# Patient Record
Sex: FEMALE | Race: WHITE | NOT HISPANIC OR LATINO | URBAN - METROPOLITAN AREA
[De-identification: names, ages, dates, MRNs, and addresses within clinical notes are randomized per-mention and may not be internally consistent; named-entity substitution may affect disease eponyms.]

---

## 2017-03-20 ENCOUNTER — OUTPATIENT (OUTPATIENT)
Dept: OUTPATIENT SERVICES | Facility: HOSPITAL | Age: 77
LOS: 1 days | End: 2017-03-20
Payer: MEDICARE

## 2017-03-20 DIAGNOSIS — R06.02 SHORTNESS OF BREATH: ICD-10-CM

## 2017-03-20 PROCEDURE — 78452 HT MUSCLE IMAGE SPECT MULT: CPT

## 2017-03-20 PROCEDURE — 93018 CV STRESS TEST I&R ONLY: CPT

## 2017-03-20 PROCEDURE — 78452 HT MUSCLE IMAGE SPECT MULT: CPT | Mod: 26

## 2017-03-20 PROCEDURE — 93017 CV STRESS TEST TRACING ONLY: CPT

## 2017-03-20 PROCEDURE — A9500: CPT

## 2017-03-20 PROCEDURE — 93016 CV STRESS TEST SUPVJ ONLY: CPT

## 2017-03-20 PROCEDURE — A9505: CPT

## 2017-03-21 ENCOUNTER — APPOINTMENT (OUTPATIENT)
Dept: VASCULAR SURGERY | Facility: CLINIC | Age: 77
End: 2017-03-21

## 2017-10-15 ENCOUNTER — EMERGENCY (EMERGENCY)
Facility: HOSPITAL | Age: 77
LOS: 1 days | Discharge: HOME | End: 2017-10-15
Attending: EMERGENCY MEDICINE | Admitting: EMERGENCY MEDICINE
Payer: MEDICARE

## 2017-10-15 VITALS
SYSTOLIC BLOOD PRESSURE: 159 MMHG | RESPIRATION RATE: 14 BRPM | HEART RATE: 86 BPM | OXYGEN SATURATION: 96 % | DIASTOLIC BLOOD PRESSURE: 85 MMHG | WEIGHT: 134.04 LBS | TEMPERATURE: 98 F

## 2017-10-15 DIAGNOSIS — Z79.82 LONG TERM (CURRENT) USE OF ASPIRIN: ICD-10-CM

## 2017-10-15 DIAGNOSIS — Z87.891 PERSONAL HISTORY OF NICOTINE DEPENDENCE: ICD-10-CM

## 2017-10-15 DIAGNOSIS — E78.00 PURE HYPERCHOLESTEROLEMIA, UNSPECIFIED: ICD-10-CM

## 2017-10-15 DIAGNOSIS — Z88.8 ALLERGY STATUS TO OTHER DRUGS, MEDICAMENTS AND BIOLOGICAL SUBSTANCES: ICD-10-CM

## 2017-10-15 DIAGNOSIS — Z79.899 OTHER LONG TERM (CURRENT) DRUG THERAPY: ICD-10-CM

## 2017-10-15 DIAGNOSIS — R07.89 OTHER CHEST PAIN: ICD-10-CM

## 2017-10-15 LAB
ALBUMIN SERPL ELPH-MCNC: 4.4 G/DL — SIGNIFICANT CHANGE UP (ref 3.3–5)
ALP SERPL-CCNC: 287 U/L — HIGH (ref 40–120)
ALT FLD-CCNC: 21 U/L — SIGNIFICANT CHANGE UP (ref 10–45)
ANION GAP SERPL CALC-SCNC: 13 MMOL/L — SIGNIFICANT CHANGE UP (ref 5–17)
AST SERPL-CCNC: 36 U/L — SIGNIFICANT CHANGE UP (ref 10–40)
BASOPHILS NFR BLD AUTO: 0.2 % — SIGNIFICANT CHANGE UP (ref 0–2)
BILIRUB SERPL-MCNC: 0.8 MG/DL — SIGNIFICANT CHANGE UP (ref 0.2–1.2)
BUN SERPL-MCNC: 21 MG/DL — SIGNIFICANT CHANGE UP (ref 7–23)
CALCIUM SERPL-MCNC: 11.1 MG/DL — HIGH (ref 8.4–10.5)
CHLORIDE SERPL-SCNC: 103 MMOL/L — SIGNIFICANT CHANGE UP (ref 96–108)
CK MB CFR SERPL CALC: 1.7 NG/ML — SIGNIFICANT CHANGE UP (ref 0–6.7)
CO2 SERPL-SCNC: 25 MMOL/L — SIGNIFICANT CHANGE UP (ref 22–31)
CREAT SERPL-MCNC: 0.86 MG/DL — SIGNIFICANT CHANGE UP (ref 0.5–1.3)
EOSINOPHIL NFR BLD AUTO: 2 % — SIGNIFICANT CHANGE UP (ref 0–6)
GLUCOSE SERPL-MCNC: 105 MG/DL — HIGH (ref 70–99)
HCT VFR BLD CALC: 41.5 % — SIGNIFICANT CHANGE UP (ref 34.5–45)
HGB BLD-MCNC: 14.4 G/DL — SIGNIFICANT CHANGE UP (ref 11.5–15.5)
LYMPHOCYTES # BLD AUTO: 16.6 % — SIGNIFICANT CHANGE UP (ref 13–44)
MCHC RBC-ENTMCNC: 31.7 PG — SIGNIFICANT CHANGE UP (ref 27–34)
MCHC RBC-ENTMCNC: 34.7 G/DL — SIGNIFICANT CHANGE UP (ref 32–36)
MCV RBC AUTO: 91.4 FL — SIGNIFICANT CHANGE UP (ref 80–100)
MONOCYTES NFR BLD AUTO: 9.6 % — SIGNIFICANT CHANGE UP (ref 2–14)
NEUTROPHILS NFR BLD AUTO: 71.6 % — SIGNIFICANT CHANGE UP (ref 43–77)
PLATELET # BLD AUTO: 246 K/UL — SIGNIFICANT CHANGE UP (ref 150–400)
POTASSIUM SERPL-MCNC: 4.1 MMOL/L — SIGNIFICANT CHANGE UP (ref 3.5–5.3)
POTASSIUM SERPL-SCNC: 4.1 MMOL/L — SIGNIFICANT CHANGE UP (ref 3.5–5.3)
PROT SERPL-MCNC: 8.4 G/DL — HIGH (ref 6–8.3)
RBC # BLD: 4.54 M/UL — SIGNIFICANT CHANGE UP (ref 3.8–5.2)
RBC # FLD: 14.1 % — SIGNIFICANT CHANGE UP (ref 10.3–16.9)
SODIUM SERPL-SCNC: 141 MMOL/L — SIGNIFICANT CHANGE UP (ref 135–145)
TROPONIN T SERPL-MCNC: <0.01 NG/ML — SIGNIFICANT CHANGE UP (ref 0–0.01)
WBC # BLD: 8.4 K/UL — SIGNIFICANT CHANGE UP (ref 3.8–10.5)
WBC # FLD AUTO: 8.4 K/UL — SIGNIFICANT CHANGE UP (ref 3.8–10.5)

## 2017-10-15 PROCEDURE — 93010 ELECTROCARDIOGRAM REPORT: CPT

## 2017-10-15 PROCEDURE — 99285 EMERGENCY DEPT VISIT HI MDM: CPT | Mod: 25

## 2017-10-15 RX ORDER — DIPHENHYDRAMINE HCL 50 MG
50 CAPSULE ORAL ONCE
Qty: 0 | Refills: 0 | Status: COMPLETED | OUTPATIENT
Start: 2017-10-15 | End: 2017-10-15

## 2017-10-15 RX ADMIN — Medication 40 MILLIGRAM(S): at 20:45

## 2017-10-15 RX ADMIN — Medication 50 MILLIGRAM(S): at 23:41

## 2017-10-15 NOTE — ED PROVIDER NOTE - ATTENDING CONTRIBUTION TO CARE
77F with midsternal chest pain after tearing corn, pt with hx of thoracic aneurysm, pt denies sob, n/v/diaphoresis. Was sent to ED yesterday by city MD yesterday. + smoker hx, nonspecific st/t changes.

## 2017-10-15 NOTE — ED PROVIDER NOTE - PHYSICAL EXAMINATION
CONSTITUTIONAL: Well-appearing; well-nourished; in no apparent distress.   HEAD: Normocephalic; atraumatic.   EYES: PERRL; EOM intact; conjunctiva and sclera clear  ENT: normal nose; no rhinorrhea; normal pharynx with no erythema or lesions.   NECK: Supple; non-tender; no LAD  CARDIOVASCULAR: Normal S1, S2; no murmurs, rubs, or gallops. Regular rate and rhythm. +tenderness to mid chest wall   RESPIRATORY: Breathing easily; breath sounds clear and equal bilaterally; no wheezes, rhonchi, or rales.  GI: Soft; non-distended; non-tender; no palpable organomegaly.   MSK: FROM at all extremities, normal tone   EXT: No cyanosis or edema; N/V intact  SKIN: Normal for age and race; warm; dry; good turgor; no apparent lesions or rash.   NEURO: A & O x 3; face symmetric; grossly unremarkable.   PSYCHOLOGICAL: The patient’s mood and manner are appropriate.

## 2017-10-15 NOTE — ED ADULT NURSE REASSESSMENT NOTE - NS ED NURSE REASSESS COMMENT FT1
pt aaox3 no deficits no sob no n/v.  mild chest pain worse with movement.  PA in speaking with pt about ct scan and needing to be pre-medicated due to her allergy to IVP dye.

## 2017-10-15 NOTE — ED ADULT NURSE NOTE - CHPI ED SYMPTOMS NEG
no back pain/no vomiting/Denies HA, visual changes, numbness or tingling to extremities./no syncope/no diaphoresis/no dizziness/no chills/no cough/no nausea/no shortness of breath/no fever

## 2017-10-15 NOTE — ED PROVIDER NOTE - PROGRESS NOTE DETAILS
Pt requesting to be discharged, prelim read negative for dissection, pt states her usual aneurysm is 4.2 cm. Agrees to call back in AM for results, agrees to f/u with Dr. Santos tomorrow in AM. Will dc with prelim results for pt request.

## 2017-10-15 NOTE — ED PROVIDER NOTE - MEDICAL DECISION MAKING DETAILS
76 yo F with pmh of a throacic aortic aneurysm, HLD and chronic back pain c/p midsternal chest pain that started 2 days ago while she was trying to break apart a piece of corn. 78 yo F with pmh of a throacic aortic aneurysm, HLD and chronic back pain c/p midsternal chest pain that started 2 days ago while she was trying to break apart a piece of corn. EKG no ST/T changes. Pain reproducible to palpation. Likely msk pain, will r/o ACS, aneurysmal rupture or dissection.

## 2017-10-15 NOTE — ED ADULT TRIAGE NOTE - CHIEF COMPLAINT QUOTE
was diagnosed with thoracic aneurysm 1 year ago ; has check up every 3 months ; was supposed to go in the next few weeks but has to call her MD because she cant make it;; has CP with movement and stretching - went to urgent care had EKG and CXR and sent here for further evaluation ; client states she needs a CT

## 2017-10-15 NOTE — ED ADULT NURSE NOTE - OBJECTIVE STATEMENT
Patient comes in ambulatory from [facility] into bed #4 complaining of constant (cannot describe pain)7/10, non radiating, mid sternal chest pain that occurred Friday night. Patient reports, "I was breaking a piece of corn on the cob and I felt something move in my chest." Since then patient reports having the pain which is worsened with deep breathing, positions and movement. Prior tx Advil minimally effective. Hx of aortic anerysm.

## 2017-10-16 VITALS
OXYGEN SATURATION: 96 % | SYSTOLIC BLOOD PRESSURE: 161 MMHG | DIASTOLIC BLOOD PRESSURE: 81 MMHG | RESPIRATION RATE: 16 BRPM | HEART RATE: 87 BPM | TEMPERATURE: 98 F

## 2017-10-16 PROCEDURE — 71275 CT ANGIOGRAPHY CHEST: CPT

## 2017-10-16 PROCEDURE — 74174 CTA ABD&PLVS W/CONTRAST: CPT | Mod: 26

## 2017-10-16 PROCEDURE — 74174 CTA ABD&PLVS W/CONTRAST: CPT

## 2017-10-16 PROCEDURE — 80053 COMPREHEN METABOLIC PANEL: CPT

## 2017-10-16 PROCEDURE — 93005 ELECTROCARDIOGRAM TRACING: CPT

## 2017-10-16 PROCEDURE — 96375 TX/PRO/DX INJ NEW DRUG ADDON: CPT | Mod: XU

## 2017-10-16 PROCEDURE — 71275 CT ANGIOGRAPHY CHEST: CPT | Mod: 26

## 2017-10-16 PROCEDURE — 82550 ASSAY OF CK (CPK): CPT

## 2017-10-16 PROCEDURE — 96374 THER/PROPH/DIAG INJ IV PUSH: CPT | Mod: XU

## 2017-10-16 PROCEDURE — 85025 COMPLETE CBC W/AUTO DIFF WBC: CPT

## 2017-10-16 PROCEDURE — 84484 ASSAY OF TROPONIN QUANT: CPT

## 2017-10-16 PROCEDURE — 99284 EMERGENCY DEPT VISIT MOD MDM: CPT | Mod: 25

## 2017-10-16 PROCEDURE — 82553 CREATINE MB FRACTION: CPT

## 2018-02-13 ENCOUNTER — APPOINTMENT (OUTPATIENT)
Dept: VASCULAR SURGERY | Facility: CLINIC | Age: 78
End: 2018-02-13
Payer: MEDICARE

## 2018-02-13 VITALS — HEART RATE: 63 BPM | SYSTOLIC BLOOD PRESSURE: 135 MMHG | DIASTOLIC BLOOD PRESSURE: 82 MMHG | OXYGEN SATURATION: 96 %

## 2018-02-13 DIAGNOSIS — I65.21 OCCLUSION AND STENOSIS OF RIGHT CAROTID ARTERY: ICD-10-CM

## 2018-02-13 PROCEDURE — 93880 EXTRACRANIAL BILAT STUDY: CPT

## 2018-02-13 PROCEDURE — 99214 OFFICE O/P EST MOD 30 MIN: CPT | Mod: 25

## 2018-02-13 RX ORDER — ROSUVASTATIN CALCIUM 10 MG/1
10 TABLET, FILM COATED ORAL
Refills: 0 | Status: ACTIVE | COMMUNITY

## 2018-03-20 ENCOUNTER — APPOINTMENT (OUTPATIENT)
Dept: VASCULAR SURGERY | Facility: CLINIC | Age: 78
End: 2018-03-20
Payer: MEDICARE

## 2018-03-20 VITALS — SYSTOLIC BLOOD PRESSURE: 136 MMHG | DIASTOLIC BLOOD PRESSURE: 83 MMHG | HEART RATE: 72 BPM | OXYGEN SATURATION: 95 %

## 2018-03-20 PROCEDURE — 99214 OFFICE O/P EST MOD 30 MIN: CPT

## 2018-06-25 ENCOUNTER — APPOINTMENT (OUTPATIENT)
Dept: ORTHOPEDIC SURGERY | Facility: CLINIC | Age: 78
End: 2018-06-25
Payer: MEDICARE

## 2018-06-25 VITALS — HEIGHT: 62 IN | BODY MASS INDEX: 24.66 KG/M2 | WEIGHT: 134 LBS | RESPIRATION RATE: 16 BRPM

## 2018-06-25 DIAGNOSIS — M20.011 MALLET FINGER OF RIGHT FINGER(S): ICD-10-CM

## 2018-06-25 DIAGNOSIS — M18.11 UNILATERAL PRIMARY OSTEOARTHRITIS OF FIRST CARPOMETACARPAL JOINT, RIGHT HAND: ICD-10-CM

## 2018-06-25 PROCEDURE — 20605 DRAIN/INJ JOINT/BURSA W/O US: CPT | Mod: RT

## 2018-06-25 PROCEDURE — 73140 X-RAY EXAM OF FINGER(S): CPT | Mod: F9

## 2018-06-25 PROCEDURE — 73110 X-RAY EXAM OF WRIST: CPT | Mod: 50

## 2018-06-25 PROCEDURE — 99203 OFFICE O/P NEW LOW 30 MIN: CPT | Mod: 25

## 2018-07-23 PROBLEM — E78.00 PURE HYPERCHOLESTEROLEMIA, UNSPECIFIED: Chronic | Status: ACTIVE | Noted: 2017-10-15

## 2018-07-23 PROBLEM — I71.9 AORTIC ANEURYSM OF UNSPECIFIED SITE, WITHOUT RUPTURE: Chronic | Status: ACTIVE | Noted: 2017-10-15

## 2018-07-30 ENCOUNTER — APPOINTMENT (OUTPATIENT)
Dept: ORTHOPEDIC SURGERY | Facility: CLINIC | Age: 78
End: 2018-07-30
Payer: MEDICARE

## 2018-07-30 DIAGNOSIS — S50.312A ABRASION OF LEFT ELBOW, INITIAL ENCOUNTER: ICD-10-CM

## 2018-07-30 DIAGNOSIS — Z00.00 ENCOUNTER FOR GENERAL ADULT MEDICAL EXAMINATION W/OUT ABNORMAL FINDINGS: ICD-10-CM

## 2018-07-30 DIAGNOSIS — S62.626P: ICD-10-CM

## 2018-07-30 PROCEDURE — 99214 OFFICE O/P EST MOD 30 MIN: CPT

## 2018-07-30 PROCEDURE — 73140 X-RAY EXAM OF FINGER(S): CPT | Mod: F9

## 2018-11-27 ENCOUNTER — APPOINTMENT (OUTPATIENT)
Dept: VASCULAR SURGERY | Facility: CLINIC | Age: 78
End: 2018-11-27

## 2018-12-03 RX ORDER — CEPHALEXIN 500 MG/1
500 CAPSULE ORAL 4 TIMES DAILY
Qty: 40 | Refills: 0 | Status: COMPLETED | COMMUNITY
Start: 2018-07-30 | End: 2018-12-03

## 2019-09-11 ENCOUNTER — OUTPATIENT (OUTPATIENT)
Dept: OUTPATIENT SERVICES | Facility: HOSPITAL | Age: 79
LOS: 1 days | End: 2019-09-11
Payer: MEDICARE

## 2019-09-11 DIAGNOSIS — R06.02 SHORTNESS OF BREATH: ICD-10-CM

## 2019-09-11 PROCEDURE — 78452 HT MUSCLE IMAGE SPECT MULT: CPT | Mod: 26

## 2019-09-11 PROCEDURE — A9500: CPT

## 2019-09-11 PROCEDURE — 93016 CV STRESS TEST SUPVJ ONLY: CPT

## 2019-09-11 PROCEDURE — 93018 CV STRESS TEST I&R ONLY: CPT

## 2019-09-11 PROCEDURE — 93017 CV STRESS TEST TRACING ONLY: CPT

## 2019-09-11 PROCEDURE — A9505: CPT

## 2019-09-11 PROCEDURE — 78452 HT MUSCLE IMAGE SPECT MULT: CPT

## 2020-10-13 ENCOUNTER — APPOINTMENT (OUTPATIENT)
Dept: VASCULAR SURGERY | Facility: CLINIC | Age: 80
End: 2020-10-13
Payer: MEDICARE

## 2020-10-13 DIAGNOSIS — I25.10 ATHEROSCLEROTIC HEART DISEASE OF NATIVE CORONARY ARTERY W/OUT ANGINA PECTORIS: ICD-10-CM

## 2020-10-13 PROCEDURE — 99214 OFFICE O/P EST MOD 30 MIN: CPT

## 2020-10-14 PROBLEM — I25.10 ATHEROSCLEROSIS OF NATIVE CORONARY ARTERY OF NATIVE HEART WITHOUT ANGINA PECTORIS: Status: ACTIVE | Noted: 2020-10-14

## 2020-10-19 NOTE — DATA REVIEWED
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  Descending thoracic aorta shows aneurysm growth from previous CTA, now measuring 5.5cm

## 2020-10-19 NOTE — PHYSICAL EXAM
[Normal Thyroid] : the thyroid was normal [Normal Breath Sounds] : Normal breath sounds [Normal Heart Sounds] : normal heart sounds [Normal Rate and Rhythm] : normal rate and rhythm [2+] : left 2+ [No HSM] : no hepatosplenomegaly [No Rash or Lesion] : No rash or lesion [Alert] : alert [Calm] : calm [JVD] : no jugular venous distention  [Carotid Bruits] : no carotid bruits [Ankle Swelling (On Exam)] : not present [Abdomen Masses] : No abdominal masses [Abdomen Tenderness] : ~T ~M No abdominal tenderness [de-identified] : NC/AT, anicteric [de-identified] : Small habitus, NAD [de-identified] : FROM throughout, strength 5/5x4, no palpable cords in extremities

## 2020-10-19 NOTE — HISTORY OF PRESENT ILLNESS
[FreeTextEntry1] : 75yoF w/h/o descending thoracic aorta presents for annual surveillance after recent CTA chest at [facility].  Pt denies any symptoms related ot his aneurysm since last visit.  Her BP is controlled, and she denies any new complaints.

## 2020-10-19 NOTE — ASSESSMENT
[FreeTextEntry1] : 75yoF w/h/o descending thoracic aorta presents for annual surveillance after recent CTA chest at [facility].  Pt denies any symptoms related ot his aneurysm since last visit.  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  Descending thoracic aorta shows aneurysm growth from previous CTA, now measuring 5.5cm.  Will discuss case with cardiologist, as pt will require carotid-SCA bypass and extensive thoracic endograft deployment to repair the aneurysm; Risks of procedure as well as ongoing observation all discussed with the patient.\par \par If medically acceptable will proceed with repair.\par \par I personally discussed this patient with the Physician Assistant at the time of the visit. I agree with the assessment and plan as written

## 2020-11-03 ENCOUNTER — TRANSCRIPTION ENCOUNTER (OUTPATIENT)
Age: 80
End: 2020-11-03

## 2020-11-03 LAB — SARS-COV-2 N GENE NPH QL NAA+PROBE: NOT DETECTED

## 2020-11-03 RX ORDER — ROSUVASTATIN CALCIUM 5 MG/1
0 TABLET ORAL
Qty: 0 | Refills: 0 | DISCHARGE

## 2020-11-03 RX ORDER — ASPIRIN/CALCIUM CARB/MAGNESIUM 324 MG
1 TABLET ORAL
Qty: 0 | Refills: 0 | DISCHARGE

## 2020-11-04 ENCOUNTER — APPOINTMENT (OUTPATIENT)
Dept: VASCULAR SURGERY | Facility: HOSPITAL | Age: 80
End: 2020-11-04

## 2020-11-04 ENCOUNTER — INPATIENT (INPATIENT)
Facility: HOSPITAL | Age: 80
LOS: 3 days | Discharge: HOME | DRG: 220 | End: 2020-11-08
Attending: SURGERY | Admitting: SURGERY
Payer: MEDICARE

## 2020-11-04 VITALS
DIASTOLIC BLOOD PRESSURE: 53 MMHG | TEMPERATURE: 97 F | RESPIRATION RATE: 12 BRPM | HEART RATE: 75 BPM | SYSTOLIC BLOOD PRESSURE: 104 MMHG

## 2020-11-04 DIAGNOSIS — Z98.890 OTHER SPECIFIED POSTPROCEDURAL STATES: Chronic | ICD-10-CM

## 2020-11-04 LAB
ALBUMIN SERPL ELPH-MCNC: 3.3 G/DL — SIGNIFICANT CHANGE UP (ref 3.3–5)
ALP SERPL-CCNC: 230 U/L — HIGH (ref 40–120)
ALT FLD-CCNC: 12 U/L — SIGNIFICANT CHANGE UP (ref 10–45)
ANION GAP SERPL CALC-SCNC: 11 MMOL/L — SIGNIFICANT CHANGE UP (ref 5–17)
APTT BLD: 32.4 SEC — SIGNIFICANT CHANGE UP (ref 27.5–35.5)
AST SERPL-CCNC: 32 U/L — SIGNIFICANT CHANGE UP (ref 10–40)
BILIRUB SERPL-MCNC: 0.9 MG/DL — SIGNIFICANT CHANGE UP (ref 0.2–1.2)
BUN SERPL-MCNC: 20 MG/DL — SIGNIFICANT CHANGE UP (ref 7–23)
CALCIUM SERPL-MCNC: 9.5 MG/DL — SIGNIFICANT CHANGE UP (ref 8.4–10.5)
CHLORIDE SERPL-SCNC: 106 MMOL/L — SIGNIFICANT CHANGE UP (ref 96–108)
CO2 SERPL-SCNC: 21 MMOL/L — LOW (ref 22–31)
CREAT SERPL-MCNC: 0.61 MG/DL — SIGNIFICANT CHANGE UP (ref 0.5–1.3)
GLUCOSE SERPL-MCNC: 151 MG/DL — HIGH (ref 70–99)
HCT VFR BLD CALC: 35.5 % — SIGNIFICANT CHANGE UP (ref 34.5–45)
HGB BLD-MCNC: 11.5 G/DL — SIGNIFICANT CHANGE UP (ref 11.5–15.5)
INR BLD: 1.21 — HIGH (ref 0.88–1.16)
LACTATE SERPL-SCNC: 1.1 MMOL/L — SIGNIFICANT CHANGE UP (ref 0.5–2)
MAGNESIUM SERPL-MCNC: 1.6 MG/DL — SIGNIFICANT CHANGE UP (ref 1.6–2.6)
MCHC RBC-ENTMCNC: 29 PG — SIGNIFICANT CHANGE UP (ref 27–34)
MCHC RBC-ENTMCNC: 32.4 GM/DL — SIGNIFICANT CHANGE UP (ref 32–36)
MCV RBC AUTO: 89.6 FL — SIGNIFICANT CHANGE UP (ref 80–100)
NRBC # BLD: 0 /100 WBCS — SIGNIFICANT CHANGE UP (ref 0–0)
PHOSPHATE SERPL-MCNC: 3.2 MG/DL — SIGNIFICANT CHANGE UP (ref 2.5–4.5)
PLATELET # BLD AUTO: 199 K/UL — SIGNIFICANT CHANGE UP (ref 150–400)
POTASSIUM SERPL-MCNC: 3.7 MMOL/L — SIGNIFICANT CHANGE UP (ref 3.5–5.3)
POTASSIUM SERPL-SCNC: 3.7 MMOL/L — SIGNIFICANT CHANGE UP (ref 3.5–5.3)
PROT SERPL-MCNC: 6.4 G/DL — SIGNIFICANT CHANGE UP (ref 6–8.3)
PROTHROM AB SERPL-ACNC: 14.4 SEC — HIGH (ref 10.6–13.6)
RBC # BLD: 3.96 M/UL — SIGNIFICANT CHANGE UP (ref 3.8–5.2)
RBC # FLD: 14.9 % — HIGH (ref 10.3–14.5)
SODIUM SERPL-SCNC: 138 MMOL/L — SIGNIFICANT CHANGE UP (ref 135–145)
WBC # BLD: 12.84 K/UL — HIGH (ref 3.8–10.5)
WBC # FLD AUTO: 12.84 K/UL — HIGH (ref 3.8–10.5)

## 2020-11-04 PROCEDURE — 34713 PERQ ACCESS & CLSR FEM ART: CPT | Mod: 59,GC

## 2020-11-04 PROCEDURE — 36200 PLACE CATHETER IN AORTA: CPT | Mod: 50,59,GC

## 2020-11-04 PROCEDURE — 75956: CPT | Mod: 26,GC

## 2020-11-04 PROCEDURE — 37242 VASC EMBOLIZE/OCCLUDE ARTERY: CPT | Mod: GC

## 2020-11-04 PROCEDURE — 33880 EVASC RPR TA NDGFT COV LSA: CPT | Mod: GC

## 2020-11-04 PROCEDURE — 34712 TCAT DLVR ENHNCD FIXJ DEV: CPT | Mod: GC

## 2020-11-04 RX ORDER — DEXTROSE 50 % IN WATER 50 %
12.5 SYRINGE (ML) INTRAVENOUS ONCE
Refills: 0 | Status: DISCONTINUED | OUTPATIENT
Start: 2020-11-04 | End: 2020-11-08

## 2020-11-04 RX ORDER — CLOPIDOGREL BISULFATE 75 MG/1
75 TABLET, FILM COATED ORAL DAILY
Refills: 0 | Status: DISCONTINUED | OUTPATIENT
Start: 2020-11-04 | End: 2020-11-08

## 2020-11-04 RX ORDER — ACETAMINOPHEN 500 MG
1000 TABLET ORAL ONCE
Refills: 0 | Status: COMPLETED | OUTPATIENT
Start: 2020-11-04 | End: 2020-11-04

## 2020-11-04 RX ORDER — LIDOCAINE 4 G/100G
1 CREAM TOPICAL ONCE
Refills: 0 | Status: COMPLETED | OUTPATIENT
Start: 2020-11-04 | End: 2020-11-04

## 2020-11-04 RX ORDER — ASPIRIN/CALCIUM CARB/MAGNESIUM 324 MG
81 TABLET ORAL DAILY
Refills: 0 | Status: DISCONTINUED | OUTPATIENT
Start: 2020-11-04 | End: 2020-11-08

## 2020-11-04 RX ORDER — SODIUM CHLORIDE 9 MG/ML
1000 INJECTION INTRAMUSCULAR; INTRAVENOUS; SUBCUTANEOUS
Refills: 0 | Status: DISCONTINUED | OUTPATIENT
Start: 2020-11-04 | End: 2020-11-05

## 2020-11-04 RX ORDER — ASPIRIN/CALCIUM CARB/MAGNESIUM 324 MG
81 TABLET ORAL DAILY
Refills: 0 | Status: DISCONTINUED | OUTPATIENT
Start: 2020-11-04 | End: 2020-11-04

## 2020-11-04 RX ORDER — GLUCAGON INJECTION, SOLUTION 0.5 MG/.1ML
1 INJECTION, SOLUTION SUBCUTANEOUS ONCE
Refills: 0 | Status: DISCONTINUED | OUTPATIENT
Start: 2020-11-04 | End: 2020-11-08

## 2020-11-04 RX ORDER — CEFAZOLIN SODIUM 1 G
2000 VIAL (EA) INJECTION EVERY 8 HOURS
Refills: 0 | Status: COMPLETED | OUTPATIENT
Start: 2020-11-04 | End: 2020-11-05

## 2020-11-04 RX ORDER — INSULIN LISPRO 100/ML
VIAL (ML) SUBCUTANEOUS
Refills: 0 | Status: DISCONTINUED | OUTPATIENT
Start: 2020-11-04 | End: 2020-11-08

## 2020-11-04 RX ORDER — MAGNESIUM SULFATE 500 MG/ML
2 VIAL (ML) INJECTION EVERY 6 HOURS
Refills: 0 | Status: COMPLETED | OUTPATIENT
Start: 2020-11-04 | End: 2020-11-05

## 2020-11-04 RX ORDER — PHENYLEPHRINE HYDROCHLORIDE 10 MG/ML
0.23 INJECTION INTRAVENOUS
Qty: 40 | Refills: 0 | Status: DISCONTINUED | OUTPATIENT
Start: 2020-11-04 | End: 2020-11-05

## 2020-11-04 RX ORDER — DEXTROSE 50 % IN WATER 50 %
15 SYRINGE (ML) INTRAVENOUS ONCE
Refills: 0 | Status: DISCONTINUED | OUTPATIENT
Start: 2020-11-04 | End: 2020-11-08

## 2020-11-04 RX ORDER — ATORVASTATIN CALCIUM 80 MG/1
80 TABLET, FILM COATED ORAL AT BEDTIME
Refills: 0 | Status: DISCONTINUED | OUTPATIENT
Start: 2020-11-04 | End: 2020-11-08

## 2020-11-04 RX ORDER — POTASSIUM CHLORIDE 20 MEQ
40 PACKET (EA) ORAL ONCE
Refills: 0 | Status: COMPLETED | OUTPATIENT
Start: 2020-11-04 | End: 2020-11-04

## 2020-11-04 RX ORDER — SODIUM CHLORIDE 9 MG/ML
1000 INJECTION, SOLUTION INTRAVENOUS
Refills: 0 | Status: DISCONTINUED | OUTPATIENT
Start: 2020-11-04 | End: 2020-11-08

## 2020-11-04 RX ADMIN — SODIUM CHLORIDE 60 MILLILITER(S): 9 INJECTION INTRAMUSCULAR; INTRAVENOUS; SUBCUTANEOUS at 17:42

## 2020-11-04 RX ADMIN — CLOPIDOGREL BISULFATE 75 MILLIGRAM(S): 75 TABLET, FILM COATED ORAL at 17:28

## 2020-11-04 RX ADMIN — Medication 50 GRAM(S): at 23:41

## 2020-11-04 RX ADMIN — ATORVASTATIN CALCIUM 80 MILLIGRAM(S): 80 TABLET, FILM COATED ORAL at 21:28

## 2020-11-04 RX ADMIN — Medication 400 MILLIGRAM(S): at 17:46

## 2020-11-04 RX ADMIN — LIDOCAINE 1 PATCH: 4 CREAM TOPICAL at 22:19

## 2020-11-04 RX ADMIN — Medication 1000 MILLIGRAM(S): at 18:16

## 2020-11-04 RX ADMIN — PHENYLEPHRINE HYDROCHLORIDE 5 MICROGRAM(S)/KG/MIN: 10 INJECTION INTRAVENOUS at 17:42

## 2020-11-04 RX ADMIN — Medication 100 MILLIGRAM(S): at 17:28

## 2020-11-04 RX ADMIN — Medication 40 MILLIEQUIVALENT(S): at 21:28

## 2020-11-04 RX ADMIN — Medication 81 MILLIGRAM(S): at 17:28

## 2020-11-04 NOTE — H&P ADULT - NSHPPHYSICALEXAM_GEN_ALL_CORE
NAD  palpable DP pulses bilat  wwp, no edema, varicose veins present, no ulcers  NSR  normal resp effort

## 2020-11-04 NOTE — H&P ADULT - NSICDXPASTMEDICALHX_GEN_ALL_CORE_FT
PAST MEDICAL HISTORY:  Aortic aneurysm     Back pain     GERD (gastroesophageal reflux disease)     Hypercholesteremia     Kommerell's diverticulum     Mallet finger of right hand     Varicose veins

## 2020-11-04 NOTE — BRIEF OPERATIVE NOTE - OPERATION/FINDINGS
TEVAR:  - R CFA access with 20F sheath, perclose x 2  - L CFA access with 5F sheath  - R SCA angiogram showed patent R vertebral artery that does not end in PICA  - unable to embolize aberrant R SCA from femoral access  - performed TEVAR to thoracic aortic aneurysm ([employer] Navion [id number], [id number], [id number])  - deployed Aptus screws to inferior portion of distal stent graft for type 1B endoleak  - sheath removed from R groin, percloses tied down, hemostasis achieved, groin soft, no hematoma  - L groin sheath removed, hemostasis achieved, groin soft, without hematoma  - obtained R radial artery access with 5F sheath. performed coil embolization to R SCA  - R radial sheath removed, hemostasis achieved after manual pressure. soft, no hematoma    contrast: 251 cc

## 2020-11-04 NOTE — CONSULT NOTE ADULT - ASSESSMENT
81 yo F with HLD, GERD, arthritis, and thoracic aortic aneurysm (5.5 cm) with aneurysmal aberrant right subclavian artery, presents for elective surgery, taken to OR today for TEVAR with embolization of aberrant right SCA transfer to SICU post-op for close neuro checks q1h and close BP checks to keep MAP goal >85 for spinal perfusion per vascular    NEURO: neuro checks q1h, especially to lower extremeties. avoid narcotics, tylenol PRN  CV: MAP goal >80 for spinal perfusion as per vascular. was MAP was 70 in PACU, so she was started on low dose phenylephrine to achieve MAP goal. ASA and plavix tonight per Vasc  PULM: no resp distress on RA  GI/FEN: NPO while flat bedrest until 9pm, then can advance to CLD when HOB up at 9pm. LR@60  : raisa for strict I&O  ENDO: ISS  ID: periop ancef x 3 doses   PPX: SCDs, hold SQH for now  LINES: Nicole Danielson (11/4--)   WOUNDS/DRAINS: bilateral groin puncture/access sites, right radial puncture/access site.

## 2020-11-04 NOTE — CONSULT NOTE ADULT - SUBJECTIVE AND OBJECTIVE BOX
81 yo F with HLD, GERD, arthritis, and thoracic aortic aneurysm (5.5 cm) with aneurysmal aberrant right subclavian artery, presents for elective TEVAR with embolization of aberrant R SCA with Dr. Zayas today.  pt taken to OR and performed TEVAR to thoracic aortic aneurysm , and right SCA coil embolization via R radial artery access with 5F sheath. , cryst 2500, UO 1700. transfer to SICU post-op for close neuro checks q1h and close BP checks to keep MAP goal >85 for spinal perfusion per vascular    PMH: as above  MEDS:      81 yo F with HLD, GERD, arthritis, and thoracic aortic aneurysm (5.5 cm) with aneurysmal aberrant right subclavian artery, presents for elective TEVAR with embolization of aberrant R SCA with Dr. Zayas today.  pt taken to OR and performed TEVAR to thoracic aortic aneurysm , and right SCA coil embolization via R radial artery access with 5F sheath. , cryst 2500, UO 1700. transfer to SICU post-op for close neuro checks q1h and close BP checks to keep MAP goal >85 for spinal perfusion per vascular    PMH: as above  MEDS: ASA 81, crestor, vicodin PRN    Allergies: Celebrex (Hives), IV Contrast (Rash), oral contrast (Rash)    ICU Vital Signs Last 24 Hrs  T(C): 36.3 (04 Nov 2020 16:44), Max: 36.3 (04 Nov 2020 16:44)  T(F): 97.4 (04 Nov 2020 16:44), Max: 97.4 (04 Nov 2020 16:44)  HR: 59 (04 Nov 2020 18:44) (59 - 77)  BP: 139/67 (04 Nov 2020 18:44) (100/55 - 139/67)  BP(mean): 96 (04 Nov 2020 18:44) (75 - 96)  ABP: 149/66 (04 Nov 2020 18:44) (116/57 - 152/74)  ABP(mean): 101 (04 Nov 2020 18:44) (78 - 106)  RR: 19 (04 Nov 2020 18:44) (12 - 31)  SpO2: 100% (04 Nov 2020 18:44) (98% - 100%)    PHYSICAL EXAM:   Neurological: AAOx3, CNII-XII intact,  strength 5/5 b/l  Cardiovascular: RRR  Respiratory: CTA  Gastrointestinal: soft, NT, ND, BS+  Extremities: warm, no dependent edema, bilateral groin punctures no hematoma, no bleeding, right wrist puncture site with dsg over,  no hematoma, no bleeding.   Vascular: no cyanosis/erythema, palpable DP's bilaterally.              79 yo F with HLD, GERD, arthritis, and thoracic aortic aneurysm (5.5 cm) with aneurysmal aberrant right subclavian artery, presents for elective surgery today,   pt taken to OR and performed TEVAR to thoracic aortic aneurysm , and right SCA coil embolization via R radial artery access with 5F sheath. , cryst 2500, UO 1700. transfer to SICU post-op for close neuro checks q1h and close BP checks to keep MAP goal >85 for spinal perfusion per vascular    PMH: as above  MEDS: ASA 81, crestor, vicodin PRN    Allergies: Celebrex (Hives), IV Contrast (Rash), oral contrast (Rash)    ICU Vital Signs Last 24 Hrs  T(C): 36.3 (04 Nov 2020 16:44), Max: 36.3 (04 Nov 2020 16:44)  T(F): 97.4 (04 Nov 2020 16:44), Max: 97.4 (04 Nov 2020 16:44)  HR: 59 (04 Nov 2020 18:44) (59 - 77)  BP: 139/67 (04 Nov 2020 18:44) (100/55 - 139/67)  BP(mean): 96 (04 Nov 2020 18:44) (75 - 96)  ABP: 149/66 (04 Nov 2020 18:44) (116/57 - 152/74)  ABP(mean): 101 (04 Nov 2020 18:44) (78 - 106)  RR: 19 (04 Nov 2020 18:44) (12 - 31)  SpO2: 100% (04 Nov 2020 18:44) (98% - 100%)    PHYSICAL EXAM:   Neurological: AAOx3, CNII-XII intact,  strength 5/5 b/l  Cardiovascular: RRR  Respiratory: CTA  Gastrointestinal: soft, NT, ND, BS+  Extremities: warm, no dependent edema, bilateral groin punctures no hematoma, no bleeding, right wrist puncture site with dsg over,  no hematoma, no bleeding.   Vascular: no cyanosis/erythema, palpable DP's bilaterally.     LABS:    11-04    138  |  106  |  20  ----------------------------<  151<H>  3.7   |  21<L>  |  0.61    Ca    9.5      04 Nov 2020 17:02  Phos  3.2     11-04  Mg     1.6     11-04    TPro  6.4  /  Alb  3.3  /  TBili  0.9  /  DBili  x   /  AST  32  /  ALT  12  /  AlkPhos  230<H>  11-04  LIVER FUNCTIONS - ( 04 Nov 2020 17:02 )  Alb: 3.3 g/dL / Pro: 6.4 g/dL / ALK PHOS: 230 U/L / ALT: 12 U/L / AST: 32 U/L / GGT: x                               11.5   12.84 )-----------( 199      ( 04 Nov 2020 17:02 )             35.5   PT/INR - ( 04 Nov 2020 17:02 )   PT: 14.4 sec;   INR: 1.21          PTT - ( 04 Nov 2020 17:02 )  PTT:32.4 sec  CAPILLARY BLOOD GLUCOSE      POCT Blood Glucose.: 146 mg/dL (04 Nov 2020 17:03)

## 2020-11-04 NOTE — H&P ADULT - ASSESSMENT
79 yo F with HLD, GERD, arthritis, and thoracic aortic aneurysm (5.5 cm) with aneurysmal aberrant right subclavian artery, presents for elective TEVAR with embolization of aberrant R SCA with Dr. Zayas today.     Plan after OR:  - SICU  - prn tylenol, no narcotics  - Q1H neuro/stroke checks  - MAP goal > 80  - IVF at 60  - aspirin and plavix post op  - NPO with meds until strict flat bedrest done at 9 pm, then can have clears  - pulse checks, groin checks Q1H  - Cano, f/u I/Os  - ISS  - ancef 24 hrs post op  - bedrest today  - f/u AM labs

## 2020-11-04 NOTE — H&P ADULT - HISTORY OF PRESENT ILLNESS
81 yo F with HLD, GERD, arthritis, and thoracic aortic aneurysm (5.5 cm) with aneurysmal aberrant right subclavian artery, presents for elective TEVAR with embolization of aberrant R SCA with Dr. Zayas today.

## 2020-11-05 LAB
A1C WITH ESTIMATED AVERAGE GLUCOSE RESULT: 5.2 % — SIGNIFICANT CHANGE UP (ref 4–5.6)
ANION GAP SERPL CALC-SCNC: 9 MMOL/L — SIGNIFICANT CHANGE UP (ref 5–17)
BUN SERPL-MCNC: 17 MG/DL — SIGNIFICANT CHANGE UP (ref 7–23)
CALCIUM SERPL-MCNC: 9.5 MG/DL — SIGNIFICANT CHANGE UP (ref 8.4–10.5)
CHLORIDE SERPL-SCNC: 104 MMOL/L — SIGNIFICANT CHANGE UP (ref 96–108)
CO2 SERPL-SCNC: 23 MMOL/L — SIGNIFICANT CHANGE UP (ref 22–31)
CREAT SERPL-MCNC: 0.67 MG/DL — SIGNIFICANT CHANGE UP (ref 0.5–1.3)
ESTIMATED AVERAGE GLUCOSE: 103 MG/DL — SIGNIFICANT CHANGE UP (ref 68–114)
GLUCOSE SERPL-MCNC: 123 MG/DL — HIGH (ref 70–99)
HCT VFR BLD CALC: 33.1 % — LOW (ref 34.5–45)
HGB BLD-MCNC: 10.8 G/DL — LOW (ref 11.5–15.5)
MAGNESIUM SERPL-MCNC: 2.3 MG/DL — SIGNIFICANT CHANGE UP (ref 1.6–2.6)
MCHC RBC-ENTMCNC: 29 PG — SIGNIFICANT CHANGE UP (ref 27–34)
MCHC RBC-ENTMCNC: 32.6 GM/DL — SIGNIFICANT CHANGE UP (ref 32–36)
MCV RBC AUTO: 88.7 FL — SIGNIFICANT CHANGE UP (ref 80–100)
NRBC # BLD: 0 /100 WBCS — SIGNIFICANT CHANGE UP (ref 0–0)
PHOSPHATE SERPL-MCNC: 2.5 MG/DL — SIGNIFICANT CHANGE UP (ref 2.5–4.5)
PLATELET # BLD AUTO: 217 K/UL — SIGNIFICANT CHANGE UP (ref 150–400)
POTASSIUM SERPL-MCNC: 4 MMOL/L — SIGNIFICANT CHANGE UP (ref 3.5–5.3)
POTASSIUM SERPL-SCNC: 4 MMOL/L — SIGNIFICANT CHANGE UP (ref 3.5–5.3)
RBC # BLD: 3.73 M/UL — LOW (ref 3.8–5.2)
RBC # FLD: 14.8 % — HIGH (ref 10.3–14.5)
SODIUM SERPL-SCNC: 136 MMOL/L — SIGNIFICANT CHANGE UP (ref 135–145)
WBC # BLD: 12.19 K/UL — HIGH (ref 3.8–10.5)
WBC # FLD AUTO: 12.19 K/UL — HIGH (ref 3.8–10.5)

## 2020-11-05 PROCEDURE — 99233 SBSQ HOSP IP/OBS HIGH 50: CPT | Mod: GC

## 2020-11-05 RX ORDER — OXYCODONE AND ACETAMINOPHEN 5; 325 MG/1; MG/1
1 TABLET ORAL EVERY 4 HOURS
Refills: 0 | Status: DISCONTINUED | OUTPATIENT
Start: 2020-11-05 | End: 2020-11-08

## 2020-11-05 RX ORDER — ACETAMINOPHEN 500 MG
1000 TABLET ORAL ONCE
Refills: 0 | Status: DISCONTINUED | OUTPATIENT
Start: 2020-11-05 | End: 2020-11-06

## 2020-11-05 RX ORDER — OXYCODONE AND ACETAMINOPHEN 5; 325 MG/1; MG/1
2 TABLET ORAL EVERY 4 HOURS
Refills: 0 | Status: DISCONTINUED | OUTPATIENT
Start: 2020-11-05 | End: 2020-11-08

## 2020-11-05 RX ORDER — HYDROMORPHONE HYDROCHLORIDE 2 MG/ML
0.5 INJECTION INTRAMUSCULAR; INTRAVENOUS; SUBCUTANEOUS ONCE
Refills: 0 | Status: DISCONTINUED | OUTPATIENT
Start: 2020-11-05 | End: 2020-11-06

## 2020-11-05 RX ORDER — HEPARIN SODIUM 5000 [USP'U]/ML
5000 INJECTION INTRAVENOUS; SUBCUTANEOUS EVERY 8 HOURS
Refills: 0 | Status: DISCONTINUED | OUTPATIENT
Start: 2020-11-05 | End: 2020-11-08

## 2020-11-05 RX ORDER — SODIUM CHLORIDE 9 MG/ML
1000 INJECTION INTRAMUSCULAR; INTRAVENOUS; SUBCUTANEOUS ONCE
Refills: 0 | Status: COMPLETED | OUTPATIENT
Start: 2020-11-05 | End: 2020-11-05

## 2020-11-05 RX ADMIN — CLOPIDOGREL BISULFATE 75 MILLIGRAM(S): 75 TABLET, FILM COATED ORAL at 11:20

## 2020-11-05 RX ADMIN — Medication 100 MILLIGRAM(S): at 09:47

## 2020-11-05 RX ADMIN — OXYCODONE AND ACETAMINOPHEN 1 TABLET(S): 5; 325 TABLET ORAL at 10:00

## 2020-11-05 RX ADMIN — LIDOCAINE 1 PATCH: 4 CREAM TOPICAL at 06:13

## 2020-11-05 RX ADMIN — Medication 81 MILLIGRAM(S): at 11:20

## 2020-11-05 RX ADMIN — Medication 85 MILLIMOLE(S): at 09:47

## 2020-11-05 RX ADMIN — OXYCODONE AND ACETAMINOPHEN 1 TABLET(S): 5; 325 TABLET ORAL at 11:09

## 2020-11-05 RX ADMIN — HEPARIN SODIUM 5000 UNIT(S): 5000 INJECTION INTRAVENOUS; SUBCUTANEOUS at 13:11

## 2020-11-05 RX ADMIN — Medication 100 MILLIGRAM(S): at 00:12

## 2020-11-05 RX ADMIN — SODIUM CHLORIDE 1000 MILLILITER(S): 9 INJECTION INTRAMUSCULAR; INTRAVENOUS; SUBCUTANEOUS at 19:00

## 2020-11-05 RX ADMIN — ATORVASTATIN CALCIUM 80 MILLIGRAM(S): 80 TABLET, FILM COATED ORAL at 23:00

## 2020-11-05 RX ADMIN — OXYCODONE AND ACETAMINOPHEN 2 TABLET(S): 5; 325 TABLET ORAL at 13:12

## 2020-11-05 RX ADMIN — Medication 50 GRAM(S): at 06:13

## 2020-11-05 RX ADMIN — LIDOCAINE 1 PATCH: 4 CREAM TOPICAL at 11:09

## 2020-11-05 RX ADMIN — OXYCODONE AND ACETAMINOPHEN 2 TABLET(S): 5; 325 TABLET ORAL at 11:20

## 2020-11-05 RX ADMIN — HEPARIN SODIUM 5000 UNIT(S): 5000 INJECTION INTRAVENOUS; SUBCUTANEOUS at 23:00

## 2020-11-05 NOTE — DIETITIAN INITIAL EVALUATION ADULT. - OTHER INFO
81 yo F with HLD, GERD, arthritis, and thoracic aortic aneurysm (5.5 cm) with aneurysmal aberrant right subclavian artery, presents for elective surgery, taken to OR today for TEVAR with embolization of aberrant right SCA transfer to SICU post-op for close neuro checks q1h and close BP checks to keep MAP goal >85 for spinal perfusion per vascular. MAP 78. Pt satting 94-95% on room air.    Pt seen resting in bed, +pain (RN aware), pt denies N/V. Last BM 11/3 per pt (pt states she is sometimes constipated PTA; does not report taking stool softeners PTA); no flatus post-op. Pt tolerating full liquid diet, consumed juice and ~25% of hot cereal (pt states she would have consumed more hot cereal but she didn't have enough sugar packets). Since assessment this AM, diet advanced to regular. Pt endorses good appetite PTA, endorses allergy to clams/muscles, follows a regular diet PTA. Pt states her UBW is 122lb and currently weighs 125lb (of note, documented weight is 129lb on 11/4). Please see below for full nutritional recommendations- d/w team. RD to monitor and f/u per protocol.

## 2020-11-05 NOTE — DIETITIAN INITIAL EVALUATION ADULT. - OTHER CALCULATIONS
ABW used to calculate energy needs due to pt's current body weight within % IBW (117%). Needs adjusted for age, post-op.

## 2020-11-05 NOTE — PROGRESS NOTE ADULT - SUBJECTIVE AND OBJECTIVE BOX
POD1 11/4: TEVAR, R subclavian aneurysm coil embolization    Patient was transferred yesterday to the SICU for monitoring and for achieving MAPS>80 for proper spinal perfusion.  Was on and off Phenylphrine tolerating FLD, having good UOP.  Denies Chest pain, nausea, vomiting, fever or chills.      ICU Vital Signs Last 24 Hrs  T(C): 36.6 (05 Nov 2020 09:23), Max: 36.7 (05 Nov 2020 01:10)  T(F): 97.9 (05 Nov 2020 09:23), Max: 98 (05 Nov 2020 01:10)  HR: 72 (05 Nov 2020 09:13) (56 - 77)  BP: 117/55 (05 Nov 2020 09:13) (100/55 - 152/72)  BP(mean): 78 (05 Nov 2020 09:13) (75 - 103)  ABP: 132/56 (05 Nov 2020 09:13) (111/53 - 152/74)  ABP(mean): 85 (05 Nov 2020 09:13) (75 - 106)  RR: 16 (05 Nov 2020 09:13) (12 - 31)  SpO2: 93% (05 Nov 2020 09:13) (92% - 100%)      Physical Exam:  General: NAD, resting comfortably in the bed  Pulmonary: normal respiratory effort  Cardiovascular: NSR, S1, S2 present  Abdominal: soft, NT/ND, no rebound tenderness, guarding, rigidity, right groin is c/d/i, no hematoma  Extremities: WWP, normal strength, no clubbing/cyanosis  Neuro: AAOx3, no focal deficits, sensation normal  Pulses:   Right:                                                                  FEM [ x]2+ [ ]1+ [ ]doppler  POP [ x]2+ [ ]1+ [ ]doppler  DP [ x]2+ [ ]1+ [ ]doppler  PT[ ]2+ [ ]1+ [ ]doppler - No signal (unchanged)    Left:  FEM [x ]2+ [ ]1+ [ ]doppler    POP [x ]2+ [ ]1+ [ ]doppler   DP [x ]2+ [ ]1+ [ ]doppler  PT [ ]2+ [ ]1+ [ ]doppler -No signal (unchanged)                          10.8   12.19 )-----------( 217      ( 05 Nov 2020 05:34 )             33.1   11-05    136  |  104  |  17  ----------------------------<  123<H>  4.0   |  23  |  0.67    Ca    9.5      05 Nov 2020 05:34  Phos  2.5     11-05  Mg     2.3     11-05    TPro  6.4  /  Alb  3.3  /  TBili  0.9  /  DBili  x   /  AST  32  /  ALT  12  /  AlkPhos  230<H>  11-04

## 2020-11-05 NOTE — DIETITIAN INITIAL EVALUATION ADULT. - PERSON TAUGHT/METHOD
verbal instruction/encouraged increased PO intake with emphasis on lean protein for healing post-op; pt appeared receptive/patient instructed

## 2020-11-05 NOTE — PROGRESS NOTE ADULT - ASSESSMENT
79 yo F with HLD, GERD, arthritis, and thoracic aortic aneurysm (5.5 cm) with aneurysmal aberrant right subclavian artery, presents for elective surgery,     taken to OR 11/4/2020 for TEVAR with embolization of aberrant right SCA transfer to SICU post-op for close neuro checks q1h and close BP checks to keep MAP goal >85 for spinal perfusion .    The patient is doing well, no issues.  Vitals are stable, Hgb is 10.8    Seen with Chief resident:    1- Percocet for pain  2- DC IVF, and start regular diet  3- DC Cano  4- walk OOB  5- maintain MAPS goal of 80, give phenylephrine if needed

## 2020-11-05 NOTE — PROGRESS NOTE ADULT - SUBJECTIVE AND OBJECTIVE BOX
Interval Events:  no events overnight. Tolerating FLD.   Patient seen and examined at bedside.      Allergies    Celebrex (Hives)  IV Contrast (Rash)  oral contrast (Rash)    Intolerances        Vital Signs Last 24 Hrs  T(C): 36.4 (05 Nov 2020 06:00), Max: 36.7 (05 Nov 2020 01:10)  T(F): 97.6 (05 Nov 2020 06:00), Max: 98 (05 Nov 2020 01:10)  HR: 68 (05 Nov 2020 07:00) (56 - 77)  BP: 122/60 (05 Nov 2020 06:01) (100/55 - 152/72)  BP(mean): 87 (05 Nov 2020 06:01) (75 - 103)  RR: 16 (05 Nov 2020 07:00) (12 - 31)  SpO2: 95% (05 Nov 2020 07:00) (92% - 100%)    11-04 @ 07:01  -  11-05 @ 07:00  --------------------------------------------------------  IN: 928 mL / OUT: 1240 mL / NET: -312 mL      11-04 @ 07:01  -  11-05 @ 07:00  --------------------------------------------------------  IN: 928 mL / OUT: 1240 mL / NET: -312 mL        Physical Exam:     Neurological: AAOx3, CNII-XII intact,  strength 5/5 b/l  Cardiovascular: RRR  Respiratory: CTA  Gastrointestinal: soft, NT, ND, BS+  Extremities: warm, no dependent edema, bilateral groin punctures no hematoma, no dc, right wrist puncture site with dsg over,  no hematoma, no dc  Vascular: no cyanosis/erythema, palpable DP's bilaterally.   Skin: no rashes noted  MSK: No joint swelling  Psych: No signs of anxiety or depression      LABS:      CBC Full  -  ( 05 Nov 2020 05:34 )  WBC Count : 12.19 K/uL  RBC Count : 3.73 M/uL  Hemoglobin : 10.8 g/dL  Hematocrit : 33.1 %  Platelet Count - Automated : 217 K/uL  Mean Cell Volume : 88.7 fl  Mean Cell Hemoglobin : 29.0 pg  Mean Cell Hemoglobin Concentration : 32.6 gm/dL  Auto Neutrophil # : x  Auto Lymphocyte # : x  Auto Monocyte # : x  Auto Eosinophil # : x  Auto Basophil # : x  Auto Neutrophil % : x  Auto Lymphocyte % : x  Auto Monocyte % : x  Auto Eosinophil % : x  Auto Basophil % : x    11-05    136  |  104  |  17  ----------------------------<  123<H>  4.0   |  23  |  0.67    Ca    9.5      05 Nov 2020 05:34  Phos  2.5     11-05  Mg     2.3     11-05    TPro  6.4  /  Alb  3.3  /  TBili  0.9  /  DBili  x   /  AST  32  /  ALT  12  /  AlkPhos  230<H>  11-04    PT/INR - ( 04 Nov 2020 17:02 )   PT: 14.4 sec;   INR: 1.21          PTT - ( 04 Nov 2020 17:02 )  PTT:32.4 sec                RADIOLOGY & ADDITIONAL STUDIES (The following images were personally reviewed):          A/p: 80yFemale Interval Events:  no events overnight. Tolerating FLD.   Patient seen and examined at bedside.      Allergies    Celebrex (Hives)  IV Contrast (Rash)  oral contrast (Rash)    Intolerances        Vital Signs Last 24 Hrs  T(C): 36.4 (05 Nov 2020 06:00), Max: 36.7 (05 Nov 2020 01:10)  T(F): 97.6 (05 Nov 2020 06:00), Max: 98 (05 Nov 2020 01:10)  HR: 68 (05 Nov 2020 07:00) (56 - 77)  BP: 122/60 (05 Nov 2020 06:01) (100/55 - 152/72)  BP(mean): 87 (05 Nov 2020 06:01) (75 - 103)  RR: 16 (05 Nov 2020 07:00) (12 - 31)  SpO2: 95% (05 Nov 2020 07:00) (92% - 100%)    11-04 @ 07:01  -  11-05 @ 07:00  --------------------------------------------------------  IN: 928 mL / OUT: 1240 mL / NET: -312 mL      11-04 @ 07:01  -  11-05 @ 07:00  --------------------------------------------------------  IN: 928 mL / OUT: 1240 mL / NET: -312 mL        Physical Exam:     Neurological: AAOx3, CNII-XII intact,  strength 5/5 b/l  Cardiovascular: RRR  Respiratory: CTA  Gastrointestinal: soft, NT, ND, BS+  Extremities: warm, no dependent edema, bilateral groin punctures no hematoma, no dc, right wrist puncture site with dsg over,  no hematoma, no dc  Vascular: no cyanosis/erythema, palpable DP's bilaterally.   Skin: no rashes noted  MSK: No joint swelling  Psych: No signs of anxiety or depression      LABS:      CBC Full  -  ( 05 Nov 2020 05:34 )  WBC Count : 12.19 K/uL  RBC Count : 3.73 M/uL  Hemoglobin : 10.8 g/dL  Hematocrit : 33.1 %  Platelet Count - Automated : 217 K/uL  Mean Cell Volume : 88.7 fl  Mean Cell Hemoglobin : 29.0 pg  Mean Cell Hemoglobin Concentration : 32.6 gm/dL  Auto Neutrophil # : x  Auto Lymphocyte # : x  Auto Monocyte # : x  Auto Eosinophil # : x  Auto Basophil # : x  Auto Neutrophil % : x  Auto Lymphocyte % : x  Auto Monocyte % : x  Auto Eosinophil % : x  Auto Basophil % : x    11-05    136  |  104  |  17  ----------------------------<  123<H>  4.0   |  23  |  0.67    Ca    9.5      05 Nov 2020 05:34  Phos  2.5     11-05  Mg     2.3     11-05    TPro  6.4  /  Alb  3.3  /  TBili  0.9  /  DBili  x   /  AST  32  /  ALT  12  /  AlkPhos  230<H>  11-04    PT/INR - ( 04 Nov 2020 17:02 )   PT: 14.4 sec;   INR: 1.21          PTT - ( 04 Nov 2020 17:02 )  PTT:32.4 sec                RADIOLOGY & ADDITIONAL STUDIES (The following images were personally reviewed):          A/p: 79 yo F with HLD, GERD, arthritis, and thoracic aortic aneurysm (5.5 cm) with aneurysmal aberrant right subclavian artery, presents for elective surgery, taken to OR today for TEVAR with embolization of aberrant right SCA transfer to SICU post-op for close neuro checks q1h and close BP checks to keep MAP goal >85 for spinal perfusion per vascular      Neuro: neuro checks q1h (lift leg off bed while applying pressure in the groin), IV Tylenol only, no narcotics  CV: goal MAP>80, on Phenylephrine, aspirin, statin, Plavix stat  NS @60 - dc this am lipitor 80 qhs  Pulm: RA  GI/FEN: FLD with HOB elevated, LR @ 60  : Rachael  Endo: [facility]  ID: Ancef 3 doses post-op (11/4)  PPx: SCDs, no SQH  Lines: PIV, L rad jt (11/4--)   Wounds/Drains/Lines: x, R radial access line, B/L groin access,  PT: HOB elevated Interval Events:  no events overnight. Tolerating FLD.   Patient seen and examined at bedside.      Allergies    Celebrex (Hives)  IV Contrast (Rash)  oral contrast (Rash)    Intolerances        Vital Signs Last 24 Hrs  T(C): 36.4 (05 Nov 2020 06:00), Max: 36.7 (05 Nov 2020 01:10)  T(F): 97.6 (05 Nov 2020 06:00), Max: 98 (05 Nov 2020 01:10)  HR: 68 (05 Nov 2020 07:00) (56 - 77)  BP: 122/60 (05 Nov 2020 06:01) (100/55 - 152/72)  BP(mean): 87 (05 Nov 2020 06:01) (75 - 103)  RR: 16 (05 Nov 2020 07:00) (12 - 31)  SpO2: 95% (05 Nov 2020 07:00) (92% - 100%)    11-04 @ 07:01  -  11-05 @ 07:00  --------------------------------------------------------  IN: 928 mL / OUT: 1240 mL / NET: -312 mL      11-04 @ 07:01  -  11-05 @ 07:00  --------------------------------------------------------  IN: 928 mL / OUT: 1240 mL / NET: -312 mL        Physical Exam:     Neurological: AAOx3, CNII-XII intact,  strength 5/5 b/l  Cardiovascular: RRR  Respiratory: CTA  Gastrointestinal: soft, NT, ND, BS+  Extremities: warm, no dependent edema, bilateral groin punctures no hematoma, no dc, right wrist puncture site with dsg over,  no hematoma, no dc  Vascular: no cyanosis/erythema, palpable DP's bilaterally.   Skin: no rashes noted  MSK: No joint swelling  Psych: No signs of anxiety or depression      LABS:      CBC Full  -  ( 05 Nov 2020 05:34 )  WBC Count : 12.19 K/uL  RBC Count : 3.73 M/uL  Hemoglobin : 10.8 g/dL  Hematocrit : 33.1 %  Platelet Count - Automated : 217 K/uL  Mean Cell Volume : 88.7 fl  Mean Cell Hemoglobin : 29.0 pg  Mean Cell Hemoglobin Concentration : 32.6 gm/dL  Auto Neutrophil # : x  Auto Lymphocyte # : x  Auto Monocyte # : x  Auto Eosinophil # : x  Auto Basophil # : x  Auto Neutrophil % : x  Auto Lymphocyte % : x  Auto Monocyte % : x  Auto Eosinophil % : x  Auto Basophil % : x    11-05    136  |  104  |  17  ----------------------------<  123<H>  4.0   |  23  |  0.67    Ca    9.5      05 Nov 2020 05:34  Phos  2.5     11-05  Mg     2.3     11-05    TPro  6.4  /  Alb  3.3  /  TBili  0.9  /  DBili  x   /  AST  32  /  ALT  12  /  AlkPhos  230<H>  11-04    PT/INR - ( 04 Nov 2020 17:02 )   PT: 14.4 sec;   INR: 1.21          PTT - ( 04 Nov 2020 17:02 )  PTT:32.4 sec                RADIOLOGY & ADDITIONAL STUDIES (The following images were personally reviewed):          A/p: 81 yo F with HLD, GERD, arthritis, and thoracic aortic aneurysm (5.5 cm) with aneurysmal aberrant right subclavian artery, presents for elective surgery, taken to OR today for TEVAR with embolization of aberrant right SCA transfer to SICU post-op for close neuro checks q1h and close BP checks to keep MAP goal >85 for spinal perfusion per vascular      Neuro:  Percocet PRN  CV: goal MAP>80, on Phenylephrine, aspirin, lipitor, Plavix   Pulm: RA  GI/FEN: Regular diet   : Voids  Endo: [facility]  ID: Ancef 3 doses post-op (11/4)  PPx: SCDs,  SQH  Lines: ROXY, L rad jt (11/4--)   Wounds/Drains/Lines: x, R radial access line, B/L groin access,  PT: OOB to chair - PT ordered 11/5.

## 2020-11-06 LAB
ANION GAP SERPL CALC-SCNC: 12 MMOL/L — SIGNIFICANT CHANGE UP (ref 5–17)
BUN SERPL-MCNC: 12 MG/DL — SIGNIFICANT CHANGE UP (ref 7–23)
CALCIUM SERPL-MCNC: 9 MG/DL — SIGNIFICANT CHANGE UP (ref 8.4–10.5)
CHLORIDE SERPL-SCNC: 103 MMOL/L — SIGNIFICANT CHANGE UP (ref 96–108)
CO2 SERPL-SCNC: 21 MMOL/L — LOW (ref 22–31)
CREAT SERPL-MCNC: 0.63 MG/DL — SIGNIFICANT CHANGE UP (ref 0.5–1.3)
GLUCOSE SERPL-MCNC: 103 MG/DL — HIGH (ref 70–99)
HCT VFR BLD CALC: 31 % — LOW (ref 34.5–45)
HGB BLD-MCNC: 10.4 G/DL — LOW (ref 11.5–15.5)
MAGNESIUM SERPL-MCNC: 2 MG/DL — SIGNIFICANT CHANGE UP (ref 1.6–2.6)
MCHC RBC-ENTMCNC: 29.8 PG — SIGNIFICANT CHANGE UP (ref 27–34)
MCHC RBC-ENTMCNC: 33.5 GM/DL — SIGNIFICANT CHANGE UP (ref 32–36)
MCV RBC AUTO: 88.8 FL — SIGNIFICANT CHANGE UP (ref 80–100)
NRBC # BLD: 0 /100 WBCS — SIGNIFICANT CHANGE UP (ref 0–0)
PHOSPHATE SERPL-MCNC: 2.2 MG/DL — LOW (ref 2.5–4.5)
PLATELET # BLD AUTO: 184 K/UL — SIGNIFICANT CHANGE UP (ref 150–400)
POTASSIUM SERPL-MCNC: 3.6 MMOL/L — SIGNIFICANT CHANGE UP (ref 3.5–5.3)
POTASSIUM SERPL-SCNC: 3.6 MMOL/L — SIGNIFICANT CHANGE UP (ref 3.5–5.3)
RBC # BLD: 3.49 M/UL — LOW (ref 3.8–5.2)
RBC # FLD: 15.1 % — HIGH (ref 10.3–14.5)
SODIUM SERPL-SCNC: 136 MMOL/L — SIGNIFICANT CHANGE UP (ref 135–145)
WBC # BLD: 9.49 K/UL — SIGNIFICANT CHANGE UP (ref 3.8–10.5)
WBC # FLD AUTO: 9.49 K/UL — SIGNIFICANT CHANGE UP (ref 3.8–10.5)

## 2020-11-06 PROCEDURE — 99233 SBSQ HOSP IP/OBS HIGH 50: CPT | Mod: GC

## 2020-11-06 RX ORDER — MIDODRINE HYDROCHLORIDE 2.5 MG/1
10 TABLET ORAL EVERY 8 HOURS
Refills: 0 | Status: DISCONTINUED | OUTPATIENT
Start: 2020-11-06 | End: 2020-11-08

## 2020-11-06 RX ORDER — TAMSULOSIN HYDROCHLORIDE 0.4 MG/1
0.4 CAPSULE ORAL AT BEDTIME
Refills: 0 | Status: DISCONTINUED | OUTPATIENT
Start: 2020-11-06 | End: 2020-11-08

## 2020-11-06 RX ORDER — SODIUM CHLORIDE 9 MG/ML
500 INJECTION, SOLUTION INTRAVENOUS ONCE
Refills: 0 | Status: COMPLETED | OUTPATIENT
Start: 2020-11-06 | End: 2020-11-06

## 2020-11-06 RX ORDER — MIDODRINE HYDROCHLORIDE 2.5 MG/1
5 TABLET ORAL EVERY 8 HOURS
Refills: 0 | Status: DISCONTINUED | OUTPATIENT
Start: 2020-11-06 | End: 2020-11-06

## 2020-11-06 RX ORDER — PHENYLEPHRINE HYDROCHLORIDE 10 MG/ML
0.4 INJECTION INTRAVENOUS
Qty: 40 | Refills: 0 | Status: DISCONTINUED | OUTPATIENT
Start: 2020-11-06 | End: 2020-11-06

## 2020-11-06 RX ORDER — POTASSIUM PHOSPHATE, MONOBASIC POTASSIUM PHOSPHATE, DIBASIC 236; 224 MG/ML; MG/ML
30 INJECTION, SOLUTION INTRAVENOUS ONCE
Refills: 0 | Status: COMPLETED | OUTPATIENT
Start: 2020-11-06 | End: 2020-11-06

## 2020-11-06 RX ADMIN — POTASSIUM PHOSPHATE, MONOBASIC POTASSIUM PHOSPHATE, DIBASIC 83.33 MILLIMOLE(S): 236; 224 INJECTION, SOLUTION INTRAVENOUS at 11:15

## 2020-11-06 RX ADMIN — CLOPIDOGREL BISULFATE 75 MILLIGRAM(S): 75 TABLET, FILM COATED ORAL at 11:15

## 2020-11-06 RX ADMIN — ATORVASTATIN CALCIUM 80 MILLIGRAM(S): 80 TABLET, FILM COATED ORAL at 22:16

## 2020-11-06 RX ADMIN — MIDODRINE HYDROCHLORIDE 10 MILLIGRAM(S): 2.5 TABLET ORAL at 22:16

## 2020-11-06 RX ADMIN — HEPARIN SODIUM 5000 UNIT(S): 5000 INJECTION INTRAVENOUS; SUBCUTANEOUS at 06:07

## 2020-11-06 RX ADMIN — SODIUM CHLORIDE 1000 MILLILITER(S): 9 INJECTION, SOLUTION INTRAVENOUS at 22:16

## 2020-11-06 RX ADMIN — Medication 81 MILLIGRAM(S): at 11:15

## 2020-11-06 RX ADMIN — PHENYLEPHRINE HYDROCHLORIDE 8.81 MICROGRAM(S)/KG/MIN: 10 INJECTION INTRAVENOUS at 04:50

## 2020-11-06 RX ADMIN — TAMSULOSIN HYDROCHLORIDE 0.4 MILLIGRAM(S): 0.4 CAPSULE ORAL at 09:41

## 2020-11-06 RX ADMIN — HEPARIN SODIUM 5000 UNIT(S): 5000 INJECTION INTRAVENOUS; SUBCUTANEOUS at 13:52

## 2020-11-06 RX ADMIN — MIDODRINE HYDROCHLORIDE 5 MILLIGRAM(S): 2.5 TABLET ORAL at 13:52

## 2020-11-06 RX ADMIN — HEPARIN SODIUM 5000 UNIT(S): 5000 INJECTION INTRAVENOUS; SUBCUTANEOUS at 22:16

## 2020-11-06 NOTE — PROVIDER CONTACT NOTE (CHANGE IN STATUS NOTIFICATION) - BACKGROUND
Patient required intermittent catheterization overnight. From this AM, patient voided twice, 200ml & 250ml this evening. Noted urine output dark/tea colored.

## 2020-11-06 NOTE — PROGRESS NOTE ADULT - ASSESSMENT
79 yo F with HLD, GERD, arthritis, and thoracic aortic aneurysm (5.5 cm) with aneurysmal aberrant right subclavian artery, presents for elective surgery, taken to OR today for TEVAR with embolization of aberrant right SCA transfer to SICU post-op for close neuro checks q1h and close BP checks to keep MAP goal >85 for spinal perfusion      Plan:    - maintain MAPS >65, turn off [place] if tolerated  - Walk OOB  - Step down

## 2020-11-06 NOTE — PROGRESS NOTE ADULT - SUBJECTIVE AND OBJECTIVE BOX
Overnight, the patient had difficulty passing urine, straight t cath was placed.  Goals of map changed to >65.    Denies chest pain or back pain.    Did not walk OOB.    ICU Vital Signs Last 24 Hrs  T(C): 36.8 (06 Nov 2020 09:44), Max: 37 (05 Nov 2020 21:30)  T(F): 98.2 (06 Nov 2020 09:44), Max: 98.6 (05 Nov 2020 21:30)  HR: 73 (06 Nov 2020 10:00) (60 - 85)  BP: 93/53 (06 Nov 2020 10:00) (83/47 - 148/68)  BP(mean): 68 (06 Nov 2020 10:00) (61 - 98)  ABP: 130/59 (05 Nov 2020 12:00) (130/59 - 140/61)  ABP(mean): 86 (05 Nov 2020 12:00) (86 - 91)  RR: 14 (06 Nov 2020 10:00) (13 - 20)  SpO2: 94% (06 Nov 2020 10:00) (91% - 96%)      Neurological: AAOx3, CNII-XII intact,  strength 5/5 b/l  Cardiovascular: RRR  Respiratory: CTA  Gastrointestinal: soft, NT, ND, BS+  Extremities: warm, no dependent edema, bilateral groin punctures no hematoma, no dc. Right wrist puncture site with dsg over, no hematoma, no dc noted.   Vascular: no cyanosis/erythema, palpable DP's bilaterally.   Skin: no rashes noted  MSK: No joint swelling  Psych: No signs of anxiety or depression      I&O's Detail    05 Nov 2020 07:01  -  06 Nov 2020 07:00  --------------------------------------------------------  IN:    Oral Fluid: 360 mL    Phenylephrine: 41.4 mL    Phenylephrine: 46.3 mL    Sodium Chloride 0.9% Bolus: 1000 mL  Total IN: 1447.7 mL    OUT:    Indwelling Catheter - Urethral (mL): 490 mL    Intermittent Catheterization - Urethral (mL): 850 mL    Voided (mL): 1100 mL  Total OUT: 2440 mL    Total NET: -992.3 mL      06 Nov 2020 07:01  -  06 Nov 2020 10:50  --------------------------------------------------------  IN:  Total IN: 0 mL    OUT:    Phenylephrine: 0 mL    Voided (mL): 50 mL  Total OUT: 50 mL    Total NET: -50 mL      MEDICATIONS  (STANDING):  aspirin  chewable 81 milliGRAM(s) Oral daily  atorvastatin 80 milliGRAM(s) Oral at bedtime  clopidogrel Tablet 75 milliGRAM(s) Oral daily  dextrose 5%. 1000 milliLiter(s) (50 mL/Hr) IV Continuous <Continuous>  dextrose 50% Injectable 12.5 Gram(s) IV Push once  heparin   Injectable 5000 Unit(s) SubCutaneous every 8 hours  insulin lispro (ADMELOG) corrective regimen sliding scale   SubCutaneous Before meals and at bedtime  midodrine 5 milliGRAM(s) Oral every 8 hours  phenylephrine    Infusion 0.4 MICROgram(s)/kG/Min (8.81 mL/Hr) IV Continuous <Continuous>  potassium phosphate IVPB 30 milliMole(s) IV Intermittent once  tamsulosin 0.4 milliGRAM(s) Oral at bedtime    MEDICATIONS  (PRN):  dextrose 40% Gel 15 Gram(s) Oral once PRN Blood Glucose LESS THAN 70 milliGRAM(s)/deciliter  glucagon  Injectable 1 milliGRAM(s) IntraMuscular once PRN Glucose LESS THAN 70 milligrams/deciliter  oxycodone    5 mG/acetaminophen 325 mG 1 Tablet(s) Oral every 4 hours PRN Moderate Pain (4 - 6)  oxycodone    5 mG/acetaminophen 325 mG 2 Tablet(s) Oral every 4 hours PRN Severe Pain (7 - 10)                          10.4   9.49  )-----------( 184      ( 06 Nov 2020 05:18 )             31.0   11-06    136  |  103  |  12  ----------------------------<  103<H>  3.6   |  21<L>  |  0.63    Ca    9.0      06 Nov 2020 05:18  Phos  2.2     11-06  Mg     2.0     11-06    TPro  6.4  /  Alb  3.3  /  TBili  0.9  /  DBili  x   /  AST  32  /  ALT  12  /  AlkPhos  230<H>  11-04  PT/INR - ( 04 Nov 2020 17:02 )   PT: 14.4 sec;   INR: 1.21          PTT - ( 04 Nov 2020 17:02 )  PTT:32.4 sec

## 2020-11-06 NOTE — PROVIDER CONTACT NOTE (CHANGE IN STATUS NOTIFICATION) - ASSESSMENT
Noted urine output dark/tea colored. Informed MD that patient only had 16OZ milkshake today, otherwise very little water/PO intake with medications. Patient otherwise had no appetite, nor did she drink much. SBP < 100, maintained map > 65, also on midodrine.

## 2020-11-06 NOTE — PHYSICAL THERAPY INITIAL EVALUATION ADULT - PERTINENT HX OF CURRENT PROBLEM, REHAB EVAL
81 yo F with HLD, GERD, arthritis, and thoracic aortic aneurysm (5.5 cm) with aneurysmal aberrant right subclavian artery, presents for elective TEVAR with embolization of aberrant R SCA with Dr. Zayas.

## 2020-11-06 NOTE — PROGRESS NOTE ADULT - SUBJECTIVE AND OBJECTIVE BOX
Interval Events:  Urinary retention overnight therefore straight cathed this am. Weaning off phenylephrine this am.   Patient seen and examined at bedside.      Allergies    Celebrex (Hives)  IV Contrast (Rash)  oral contrast (Rash)  shellfish (Unknown)    Intolerances        Vital Signs Last 24 Hrs  T(C): 36.2 (06 Nov 2020 06:08), Max: 37 (05 Nov 2020 21:30)  T(F): 97.2 (06 Nov 2020 06:08), Max: 98.6 (05 Nov 2020 21:30)  HR: 65 (06 Nov 2020 06:08) (60 - 85)  BP: 122/57 (06 Nov 2020 06:00) (83/47 - 148/68)  BP(mean): 83 (06 Nov 2020 06:00) (61 - 98)  RR: 15 (06 Nov 2020 06:00) (14 - 20)  SpO2: 96% (06 Nov 2020 06:08) (91% - 96%)    11-05 @ 07:01  -  11-06 @ 07:00  --------------------------------------------------------  IN: 1447.7 mL / OUT: 1890 mL / NET: -442.3 mL      11-05 @ 07:01  -  11-06 @ 07:00  --------------------------------------------------------  IN: 1447.7 mL / OUT: 1890 mL / NET: -442.3 mL        Physical Exam:     Neurological: AAOx3, CNII-XII intact,  strength 5/5 b/l  Cardiovascular: RRR  Respiratory: CTA  Gastrointestinal: soft, NT, ND, BS+  Extremities: warm, no dependent edema, bilateral groin punctures no hematoma, no dc. Right wrist puncture site with dsg over, no hematoma, no dc noted.   Vascular: no cyanosis/erythema, palpable DP's bilaterally.   Skin: no rashes noted  MSK: No joint swelling  Psych: No signs of anxiety or depression        LABS:      CBC Full  -  ( 06 Nov 2020 05:18 )  WBC Count : 9.49 K/uL  RBC Count : 3.49 M/uL  Hemoglobin : 10.4 g/dL  Hematocrit : 31.0 %  Platelet Count - Automated : 184 K/uL  Mean Cell Volume : 88.8 fl  Mean Cell Hemoglobin : 29.8 pg  Mean Cell Hemoglobin Concentration : 33.5 gm/dL  Auto Neutrophil # : x  Auto Lymphocyte # : x  Auto Monocyte # : x  Auto Eosinophil # : x  Auto Basophil # : x  Auto Neutrophil % : x  Auto Lymphocyte % : x  Auto Monocyte % : x  Auto Eosinophil % : x  Auto Basophil % : x    11-06    136  |  103  |  12  ----------------------------<  103<H>  3.6   |  21<L>  |  0.63    Ca    9.0      06 Nov 2020 05:18  Phos  2.2     11-06  Mg     2.0     11-06    TPro  6.4  /  Alb  3.3  /  TBili  0.9  /  DBili  x   /  AST  32  /  ALT  12  /  AlkPhos  230<H>  11-04    PT/INR - ( 04 Nov 2020 17:02 )   PT: 14.4 sec;   INR: 1.21          PTT - ( 04 Nov 2020 17:02 )  PTT:32.4 sec                RADIOLOGY & ADDITIONAL STUDIES (The following images were personally reviewed):          A/p: 81 yo F with HLD, GERD, arthritis, and thoracic aortic aneurysm (5.5 cm) with aneurysmal aberrant right subclavian artery, presents for elective surgery, taken to OR today for TEVAR with embolization of aberrant right SCA transfer to SICU post-op for close neuro checks q1h and close BP checks to keep MAP goal >85 for spinal perfusion per vascular      Neuro:  Percocet PRN  CV: goal MAP>65, on Phenylephrine, aspirin, Lipitor,Plavix   Pulm: RA  GI/FEN: Regular diet enlive qd  : Voids + Urinary retention most likely 2* narcotic use and decreased ambulation - encourage ambualtion and limitation on narcotics. Poss start flomax? Cont straight cath for PVR > 300 q6 hrs  Endo: [facility]  ID: Ancef 3 doses post-op (11/4)  PPx: SCDs,  SQH  Lines: PIV, L rad jt (11/4--)   Wounds/Drains/Lines: x, R radial access line, B/L groin access,  PT: OOB to chair - PT ordered 11/5.    Interval Events:  Urinary retention overnight therefore straight cathed this am. Weaning off phenylephrine this am.   Patient seen and examined at bedside.      Allergies    Celebrex (Hives)  IV Contrast (Rash)  oral contrast (Rash)  shellfish (Unknown)    Intolerances        Vital Signs Last 24 Hrs  T(C): 36.2 (06 Nov 2020 06:08), Max: 37 (05 Nov 2020 21:30)  T(F): 97.2 (06 Nov 2020 06:08), Max: 98.6 (05 Nov 2020 21:30)  HR: 65 (06 Nov 2020 06:08) (60 - 85)  BP: 122/57 (06 Nov 2020 06:00) (83/47 - 148/68)  BP(mean): 83 (06 Nov 2020 06:00) (61 - 98)  RR: 15 (06 Nov 2020 06:00) (14 - 20)  SpO2: 96% (06 Nov 2020 06:08) (91% - 96%)    11-05 @ 07:01  -  11-06 @ 07:00  --------------------------------------------------------  IN: 1447.7 mL / OUT: 1890 mL / NET: -442.3 mL      11-05 @ 07:01  -  11-06 @ 07:00  --------------------------------------------------------  IN: 1447.7 mL / OUT: 1890 mL / NET: -442.3 mL        Physical Exam:     Neurological: AAOx3, CNII-XII intact,  strength 5/5 b/l  Cardiovascular: RRR  Respiratory: CTA  Gastrointestinal: soft, NT, ND, BS+  Extremities: warm, no dependent edema, bilateral groin punctures no hematoma, no dc. Right wrist puncture site with dsg over, no hematoma, no dc noted.   Vascular: no cyanosis/erythema, palpable DP's bilaterally.   Skin: no rashes noted  MSK: No joint swelling  Psych: No signs of anxiety or depression        LABS:      CBC Full  -  ( 06 Nov 2020 05:18 )  WBC Count : 9.49 K/uL  RBC Count : 3.49 M/uL  Hemoglobin : 10.4 g/dL  Hematocrit : 31.0 %  Platelet Count - Automated : 184 K/uL  Mean Cell Volume : 88.8 fl  Mean Cell Hemoglobin : 29.8 pg  Mean Cell Hemoglobin Concentration : 33.5 gm/dL  Auto Neutrophil # : x  Auto Lymphocyte # : x  Auto Monocyte # : x  Auto Eosinophil # : x  Auto Basophil # : x  Auto Neutrophil % : x  Auto Lymphocyte % : x  Auto Monocyte % : x  Auto Eosinophil % : x  Auto Basophil % : x    11-06    136  |  103  |  12  ----------------------------<  103<H>  3.6   |  21<L>  |  0.63    Ca    9.0      06 Nov 2020 05:18  Phos  2.2     11-06  Mg     2.0     11-06    TPro  6.4  /  Alb  3.3  /  TBili  0.9  /  DBili  x   /  AST  32  /  ALT  12  /  AlkPhos  230<H>  11-04    PT/INR - ( 04 Nov 2020 17:02 )   PT: 14.4 sec;   INR: 1.21          PTT - ( 04 Nov 2020 17:02 )  PTT:32.4 sec                RADIOLOGY & ADDITIONAL STUDIES (The following images were personally reviewed):          A/p: 81 yo F with HLD, GERD, arthritis, and thoracic aortic aneurysm (5.5 cm) with aneurysmal aberrant right subclavian artery, presents for elective surgery, taken to OR today for TEVAR with embolization of aberrant right SCA transfer to SICU post-op for close neuro checks q1h and close BP checks to keep MAP goal >85 for spinal perfusion per vascular      Neuro:  Percocet PRN  CV: goal MAP>65, weaned off levo. Aspirin, Lipitor, Plavix   Pulm: RA  GI/FEN: Regular diet enlive qd  : Voids + Urinary retention most likely 2* narcotic use and decreased ambulation - encourage ambulation and limitation on narcotics. Start flomax Cont Bladder scan and straight cath for PVR > 300 q6 hrs  Endo: [facility]  ID: Ancef 3 doses post-op (11/4)  PPx: SCDs,  SQH  Lines: PIV, L rad jt (11/4--)   Wounds/DrainsB/L groin access,  PT: OOB to chair - PT ordered 11/5.

## 2020-11-06 NOTE — PHYSICAL THERAPY INITIAL EVALUATION ADULT - GAIT DISTANCE, PT EVAL
80 feet with RW. Pt also ambulated ~120 feet without AD but req PT assist 2/2 LOB which may have resulted in fall. Pt exhibits no unsteadiness using RW.

## 2020-11-07 LAB
ANION GAP SERPL CALC-SCNC: 12 MMOL/L — SIGNIFICANT CHANGE UP (ref 5–17)
BUN SERPL-MCNC: 14 MG/DL — SIGNIFICANT CHANGE UP (ref 7–23)
CALCIUM SERPL-MCNC: 9.8 MG/DL — SIGNIFICANT CHANGE UP (ref 8.4–10.5)
CHLORIDE SERPL-SCNC: 104 MMOL/L — SIGNIFICANT CHANGE UP (ref 96–108)
CO2 SERPL-SCNC: 22 MMOL/L — SIGNIFICANT CHANGE UP (ref 22–31)
CREAT SERPL-MCNC: 0.69 MG/DL — SIGNIFICANT CHANGE UP (ref 0.5–1.3)
GLUCOSE SERPL-MCNC: 95 MG/DL — SIGNIFICANT CHANGE UP (ref 70–99)
HCT VFR BLD CALC: 31.9 % — LOW (ref 34.5–45)
HGB BLD-MCNC: 10.4 G/DL — LOW (ref 11.5–15.5)
MAGNESIUM SERPL-MCNC: 1.8 MG/DL — SIGNIFICANT CHANGE UP (ref 1.6–2.6)
MCHC RBC-ENTMCNC: 28.8 PG — SIGNIFICANT CHANGE UP (ref 27–34)
MCHC RBC-ENTMCNC: 32.6 GM/DL — SIGNIFICANT CHANGE UP (ref 32–36)
MCV RBC AUTO: 88.4 FL — SIGNIFICANT CHANGE UP (ref 80–100)
NRBC # BLD: 0 /100 WBCS — SIGNIFICANT CHANGE UP (ref 0–0)
PHOSPHATE SERPL-MCNC: 2.2 MG/DL — LOW (ref 2.5–4.5)
PLATELET # BLD AUTO: 141 K/UL — LOW (ref 150–400)
POTASSIUM SERPL-MCNC: 3.3 MMOL/L — LOW (ref 3.5–5.3)
POTASSIUM SERPL-SCNC: 3.3 MMOL/L — LOW (ref 3.5–5.3)
RBC # BLD: 3.61 M/UL — LOW (ref 3.8–5.2)
RBC # FLD: 14.9 % — HIGH (ref 10.3–14.5)
SODIUM SERPL-SCNC: 138 MMOL/L — SIGNIFICANT CHANGE UP (ref 135–145)
WBC # BLD: 6.42 K/UL — SIGNIFICANT CHANGE UP (ref 3.8–10.5)
WBC # FLD AUTO: 6.42 K/UL — SIGNIFICANT CHANGE UP (ref 3.8–10.5)

## 2020-11-07 PROCEDURE — 71045 X-RAY EXAM CHEST 1 VIEW: CPT | Mod: 26

## 2020-11-07 PROCEDURE — 99232 SBSQ HOSP IP/OBS MODERATE 35: CPT | Mod: GC

## 2020-11-07 RX ORDER — POTASSIUM PHOSPHATE, MONOBASIC POTASSIUM PHOSPHATE, DIBASIC 236; 224 MG/ML; MG/ML
30 INJECTION, SOLUTION INTRAVENOUS ONCE
Refills: 0 | Status: COMPLETED | OUTPATIENT
Start: 2020-11-07 | End: 2020-11-07

## 2020-11-07 RX ORDER — SODIUM CHLORIDE 9 MG/ML
250 INJECTION, SOLUTION INTRAVENOUS ONCE
Refills: 0 | Status: COMPLETED | OUTPATIENT
Start: 2020-11-07 | End: 2020-11-07

## 2020-11-07 RX ORDER — POTASSIUM CHLORIDE 20 MEQ
40 PACKET (EA) ORAL ONCE
Refills: 0 | Status: COMPLETED | OUTPATIENT
Start: 2020-11-07 | End: 2020-11-07

## 2020-11-07 RX ORDER — SENNA PLUS 8.6 MG/1
2 TABLET ORAL AT BEDTIME
Refills: 0 | Status: DISCONTINUED | OUTPATIENT
Start: 2020-11-07 | End: 2020-11-08

## 2020-11-07 RX ORDER — SODIUM,POTASSIUM PHOSPHATES 278-250MG
1 POWDER IN PACKET (EA) ORAL EVERY 4 HOURS
Refills: 0 | Status: COMPLETED | OUTPATIENT
Start: 2020-11-07 | End: 2020-11-08

## 2020-11-07 RX ADMIN — Medication 40 MILLIEQUIVALENT(S): at 16:20

## 2020-11-07 RX ADMIN — MIDODRINE HYDROCHLORIDE 10 MILLIGRAM(S): 2.5 TABLET ORAL at 06:39

## 2020-11-07 RX ADMIN — HEPARIN SODIUM 5000 UNIT(S): 5000 INJECTION INTRAVENOUS; SUBCUTANEOUS at 06:38

## 2020-11-07 RX ADMIN — ATORVASTATIN CALCIUM 80 MILLIGRAM(S): 80 TABLET, FILM COATED ORAL at 22:38

## 2020-11-07 RX ADMIN — Medication 1 PACKET(S): at 22:39

## 2020-11-07 RX ADMIN — HEPARIN SODIUM 5000 UNIT(S): 5000 INJECTION INTRAVENOUS; SUBCUTANEOUS at 22:36

## 2020-11-07 RX ADMIN — CLOPIDOGREL BISULFATE 75 MILLIGRAM(S): 75 TABLET, FILM COATED ORAL at 12:11

## 2020-11-07 RX ADMIN — MIDODRINE HYDROCHLORIDE 10 MILLIGRAM(S): 2.5 TABLET ORAL at 22:37

## 2020-11-07 RX ADMIN — SODIUM CHLORIDE 1500 MILLILITER(S): 9 INJECTION, SOLUTION INTRAVENOUS at 02:50

## 2020-11-07 RX ADMIN — MIDODRINE HYDROCHLORIDE 10 MILLIGRAM(S): 2.5 TABLET ORAL at 13:52

## 2020-11-07 RX ADMIN — SENNA PLUS 2 TABLET(S): 8.6 TABLET ORAL at 22:36

## 2020-11-07 RX ADMIN — Medication 1 PACKET(S): at 18:09

## 2020-11-07 RX ADMIN — HEPARIN SODIUM 5000 UNIT(S): 5000 INJECTION INTRAVENOUS; SUBCUTANEOUS at 13:52

## 2020-11-07 RX ADMIN — TAMSULOSIN HYDROCHLORIDE 0.4 MILLIGRAM(S): 0.4 CAPSULE ORAL at 22:36

## 2020-11-07 RX ADMIN — Medication 81 MILLIGRAM(S): at 12:11

## 2020-11-07 NOTE — PROVIDER CONTACT NOTE (OTHER) - ASSESSMENT
Pt c/o pain underneath b/l breasts & lower abdominal pain 6/10. Pt c/o pain underneath b/l breasts & lower abdominal pain 6/10. Patient also has redness to last 2 knuckles b/l.

## 2020-11-07 NOTE — PROGRESS NOTE ADULT - SUBJECTIVE AND OBJECTIVE BOX
aspirin  chewable 81  clopidogrel Tablet 75  heparin   Injectable 5000  midodrine 10  tamsulosin 0.4        Vital Signs Last 24 Hrs  T(C): 36.4 (07 Nov 2020 02:45), Max: 36.8 (06 Nov 2020 09:44)  T(F): 97.5 (07 Nov 2020 02:45), Max: 98.2 (06 Nov 2020 09:44)  HR: 69 (07 Nov 2020 08:00) (61 - 92)  BP: 108/52 (07 Nov 2020 08:00) (71/33 - 135/63)  BP(mean): 75 (07 Nov 2020 08:00) (47 - 90)  RR: 15 (07 Nov 2020 08:00) (11 - 15)  SpO2: 96% (07 Nov 2020 08:00) (91% - 99%)  I&O's Summary    06 Nov 2020 07:01  -  07 Nov 2020 07:00  --------------------------------------------------------  IN: 1000 mL / OUT: 1675 mL / NET: -675 mL        Physical Exam:  General: NAD, resting comfortably in bed  Pulmonary: Nonlabored breathing, no respiratory distress  Cardiovascular:  Abdominal:  Extremities:  Vascular:      LABS:                        10.4   6.42  )-----------( 141      ( 07 Nov 2020 07:32 )             31.9     11-07    138  |  104  |  14  ----------------------------<  95  3.3<L>   |  22  |  0.69    Ca    9.8      07 Nov 2020 07:32  Phos  2.2     11-07  Mg     1.8     11-07         VASCULAR SURGERY PROGRESS NOTE    SUMMARY: 81 yo F with HLD, GERD, arthritis, and thoracic aortic aneurysm (5.5 cm) with aneurysmal aberrant right subclavian artery, presents for elective surgery, now s/p EVAR with embolization of aberrant right SCA transfer to SICU post-op for close neuro checks q1h and close BP checks to keep MAP goal >85 for spinal perfusion per vascular    24 HOUR EVENTS:   Goal MAP decreased to 65. Chest pain this AM. EKG, CXR, Trop pending. 500 cc bolus for dark urine and low UOP     Subjective:  Patient seen and examined at bedside by chief resident. Patient feels well overall but complains of some chest pain at the inferior edge of the breasts today. Denies N/V, palpitations, SOB, fevers, chills. Urinary symptoms resolved. Reports ambulating well with physical therapy. Also complains of nontender erythema of 2 knuckles b/l.     aspirin  chewable 81  clopidogrel Tablet 75  heparin   Injectable 5000  midodrine 10  tamsulosin 0.4        Vital Signs Last 24 Hrs  T(C): 36.4 (07 Nov 2020 02:45), Max: 36.8 (06 Nov 2020 09:44)  T(F): 97.5 (07 Nov 2020 02:45), Max: 98.2 (06 Nov 2020 09:44)  HR: 69 (07 Nov 2020 08:00) (61 - 92)  BP: 108/52 (07 Nov 2020 08:00) (71/33 - 135/63)  BP(mean): 75 (07 Nov 2020 08:00) (47 - 90)  RR: 15 (07 Nov 2020 08:00) (11 - 15)  SpO2: 96% (07 Nov 2020 08:00) (91% - 99%)  I&O's Summary    06 Nov 2020 07:01  -  07 Nov 2020 07:00  --------------------------------------------------------  IN: 1000 mL / OUT: 1675 mL / NET: -675 mL        Physical Exam:  Neurological: AAOx3, strength 5/5 b/l  Cardiovascular: RRR  Respiratory: no respiratory distress on RA   Gastrointestinal: soft, NT, ND  Extremities: warm, no dependent edema, bilateral groin punctures soft, no hematoma Right wrist puncture site clean, no hematoma or discoloration   Vascular: no cyanosis/erythema, palpable DP b/l lower extremities     LABS:                        10.4   6.42  )-----------( 141      ( 07 Nov 2020 07:32 )             31.9     11-07    138  |  104  |  14  ----------------------------<  95  3.3<L>   |  22  |  0.69    Ca    9.8      07 Nov 2020 07:32  Phos  2.2     11-07  Mg     1.8     11-07       81 yo F with HLD, GERD, arthritis, and thoracic aortic aneurysm (5.5 cm) with aneurysmal aberrant right subclavian artery, presents for elective surgery, taken to OR today for TEVAR with embolization of aberrant right SCA transfer to SICU post-op for close neuro checks q1h and close BP checks to keep MAP goal >85 for spinal perfusion. Now ambulating well, maintaining BPs off pressors.       Plan:    - maintain MAPS >65, off pressors   - Walk OOBA   - F/u EKG, trop, CXR   - Step down to telemetry      Plan discussed with attending and chief resident.   ____________________________________________________  Vannessa Trotter, PGY1  Vascular Surgery

## 2020-11-07 NOTE — PROGRESS NOTE ADULT - SUBJECTIVE AND OBJECTIVE BOX
Overnight chest pain inferior breast/xiphoid today, EKG neg, trop neg.     Was given a bolus of 500 cc crystalloid for dark urine.    ICU Vital Signs Last 24 Hrs  T(C): 36.6 (07 Nov 2020 14:45), Max: 36.7 (06 Nov 2020 18:34)  T(F): 97.9 (07 Nov 2020 14:45), Max: 98 (06 Nov 2020 18:34)  HR: 73 (07 Nov 2020 15:00) (61 - 92)  BP: 128/59 (07 Nov 2020 15:00) (71/33 - 135/63)  BP(mean): 85 (07 Nov 2020 15:00) (47 - 90)  ABP: --  ABP(mean): --  RR: 16 (07 Nov 2020 15:00) (11 - 18)  SpO2: 95% (07 Nov 2020 15:00) (91% - 99%)      Physical Exam:    Neurological: AAOx3, CNII-XII intact,  strength 5/5 b/l  Cardiovascular: RRR  Respiratory: CTA  Gastrointestinal: soft, NT, ND, BS+  Extremities: warm, no dependent edema, bilateral groin punctures no hematoma, no dc. Right wrist puncture site with dsg over, no hematoma, no dc noted.   Vascular: no cyanosis/erythema, palpable DP's bilaterally.   Skin: no rashes noted  MSK: No joint swelling  Psych: No signs of anxiety or depression                          10.4   6.42  )-----------( 141      ( 07 Nov 2020 07:32 )             31.9   11-07    138  |  104  |  14  ----------------------------<  95  3.3<L>   |  22  |  0.69    Ca    9.8      07 Nov 2020 07:32  Phos  2.2     11-07  Mg     1.8     11-07    Troponin T, Serum (11.07.20 @ 07:32)    Troponin T, Serum: <0.01: Reference interval for troponin T is </= 0.01 ng/mL which includes the  99th percentile of a healthy population. Troponin T results are not  interchangeable with troponin I results. ng/mL

## 2020-11-08 ENCOUNTER — TRANSCRIPTION ENCOUNTER (OUTPATIENT)
Age: 80
End: 2020-11-08

## 2020-11-08 VITALS — DIASTOLIC BLOOD PRESSURE: 64 MMHG | SYSTOLIC BLOOD PRESSURE: 116 MMHG

## 2020-11-08 PROCEDURE — 85730 THROMBOPLASTIN TIME PARTIAL: CPT

## 2020-11-08 PROCEDURE — 71045 X-RAY EXAM CHEST 1 VIEW: CPT

## 2020-11-08 PROCEDURE — C1725: CPT

## 2020-11-08 PROCEDURE — 82962 GLUCOSE BLOOD TEST: CPT

## 2020-11-08 PROCEDURE — 85610 PROTHROMBIN TIME: CPT

## 2020-11-08 PROCEDURE — 86900 BLOOD TYPING SEROLOGIC ABO: CPT

## 2020-11-08 PROCEDURE — 97161 PT EVAL LOW COMPLEX 20 MIN: CPT

## 2020-11-08 PROCEDURE — C1760: CPT

## 2020-11-08 PROCEDURE — 80048 BASIC METABOLIC PNL TOTAL CA: CPT

## 2020-11-08 PROCEDURE — 86923 COMPATIBILITY TEST ELECTRIC: CPT

## 2020-11-08 PROCEDURE — C1769: CPT

## 2020-11-08 PROCEDURE — 83605 ASSAY OF LACTIC ACID: CPT

## 2020-11-08 PROCEDURE — 86850 RBC ANTIBODY SCREEN: CPT

## 2020-11-08 PROCEDURE — 83036 HEMOGLOBIN GLYCOSYLATED A1C: CPT

## 2020-11-08 PROCEDURE — 36415 COLL VENOUS BLD VENIPUNCTURE: CPT

## 2020-11-08 PROCEDURE — C1874: CPT

## 2020-11-08 PROCEDURE — 84100 ASSAY OF PHOSPHORUS: CPT

## 2020-11-08 PROCEDURE — 85027 COMPLETE CBC AUTOMATED: CPT

## 2020-11-08 PROCEDURE — C1887: CPT

## 2020-11-08 PROCEDURE — 86901 BLOOD TYPING SEROLOGIC RH(D): CPT

## 2020-11-08 PROCEDURE — 84484 ASSAY OF TROPONIN QUANT: CPT

## 2020-11-08 PROCEDURE — 83735 ASSAY OF MAGNESIUM: CPT

## 2020-11-08 PROCEDURE — 80053 COMPREHEN METABOLIC PANEL: CPT

## 2020-11-08 PROCEDURE — C1889: CPT

## 2020-11-08 PROCEDURE — C1894: CPT

## 2020-11-08 PROCEDURE — 76000 FLUOROSCOPY <1 HR PHYS/QHP: CPT

## 2020-11-08 RX ORDER — CLOPIDOGREL BISULFATE 75 MG/1
1 TABLET, FILM COATED ORAL
Qty: 30 | Refills: 0
Start: 2020-11-08 | End: 2020-12-07

## 2020-11-08 RX ORDER — MIDODRINE HYDROCHLORIDE 2.5 MG/1
1 TABLET ORAL
Qty: 90 | Refills: 0
Start: 2020-11-08 | End: 2020-12-07

## 2020-11-08 RX ADMIN — HEPARIN SODIUM 5000 UNIT(S): 5000 INJECTION INTRAVENOUS; SUBCUTANEOUS at 07:43

## 2020-11-08 RX ADMIN — Medication 81 MILLIGRAM(S): at 10:36

## 2020-11-08 RX ADMIN — CLOPIDOGREL BISULFATE 75 MILLIGRAM(S): 75 TABLET, FILM COATED ORAL at 10:36

## 2020-11-08 RX ADMIN — MIDODRINE HYDROCHLORIDE 10 MILLIGRAM(S): 2.5 TABLET ORAL at 07:43

## 2020-11-08 RX ADMIN — Medication 1 PACKET(S): at 04:26

## 2020-11-08 RX ADMIN — Medication 1 PACKET(S): at 09:02

## 2020-11-08 RX ADMIN — MIDODRINE HYDROCHLORIDE 10 MILLIGRAM(S): 2.5 TABLET ORAL at 11:42

## 2020-11-08 NOTE — DISCHARGE NOTE PROVIDER - NSDCCPTREATMENT_GEN_ALL_CORE_FT
PRINCIPAL PROCEDURE  Procedure: Endovascular repair of thoracic aorta  Findings and Treatment:

## 2020-11-08 NOTE — PROGRESS NOTE ADULT - SUBJECTIVE AND OBJECTIVE BOX
24 HOUR EVENTS:   ON: AJ, SBPs in the 80s-110s, moving all ext., ambulating   11/7: chest pain inferior breast/xiphoid today, EKG neg, trop neg. CXR neg, senna added for constipations 1x dark brown stool, SBPs decreased to 80-90s but maintained map >65        81 yo F with HLD, GERD, arthritis, and thoracic aortic aneurysm (5.5 cm) with aneurysmal aberrant right subclavian artery, presents for elective surgery, taken to OR today for TEVAR with embolization of aberrant right SCA transfer to SICU post-op for close neuro checks q1h and close BP checks to keep MAP goal >85 for spinal perfusion. Stepped down from SICU maintaining MAP>65 off pressors, on midodrine.       Plan:    - maintain MAPS >65  - Walk OOB  - Discharge home 24 HOUR EVENTS:   ON: AJ, SBPs in the 80s-110s, moving all ext., ambulating   11/7: chest pain inferior breast/xiphoid today, EKG neg, trop neg. CXR neg, senna added for constipations 1x dark brown stool, SBPs decreased to 80-90s but maintained map >65    aspirin  chewable 81  clopidogrel Tablet 75  heparin   Injectable 5000  midodrine 10  tamsulosin 0.4      Allergies    Celebrex (Hives)  IV Contrast (Rash)  oral contrast (Rash)  shellfish (Unknown)    Intolerances        Vital Signs Last 24 Hrs  T(C): 37.1 (08 Nov 2020 06:43), Max: 37.2 (07 Nov 2020 22:24)  T(F): 98.8 (08 Nov 2020 06:43), Max: 98.9 (07 Nov 2020 22:24)  HR: 65 (08 Nov 2020 08:41) (62 - 86)  BP: 99/53 (08 Nov 2020 08:41) (87/61 - 135/60)  BP(mean): 74 (08 Nov 2020 08:41) (68 - 95)  RR: 18 (08 Nov 2020 08:41) (15 - 18)  SpO2: 99% (08 Nov 2020 08:41) (94% - 99%)  I&O's Summary    07 Nov 2020 07:01  -  08 Nov 2020 07:00  --------------------------------------------------------  IN: 0 mL / OUT: 750 mL / NET: -750 mL          Physical Exam:  Neurological: AAOx3, strength 5/5 b/l  Cardiovascular: RRR  Respiratory: no respiratory distress on RA   Gastrointestinal: soft, NT, ND  Extremities: warm, no dependent edema, bilateral groin punctures soft, no hematoma, resolving ecchymosis, right wrist puncture site clean, no hematoma or discoloration   Vascular: no cyanosis/erythema, palpable DP b/l lower extremities       LABS:                        10.4   6.42  )-----------( 141      ( 07 Nov 2020 07:32 )             31.9     11-07    138  |  104  |  14  ----------------------------<  95  3.3<L>   |  22  |  0.69    Ca    9.8      07 Nov 2020 07:32  Phos  2.2     11-07  Mg     1.8     11-07        81 yo F with HLD, GERD, arthritis, and thoracic aortic aneurysm (5.5 cm) with aneurysmal aberrant right subclavian artery, presents for elective surgery, taken to OR today for TEVAR with embolization of aberrant right SCA transfer to SICU post-op for close neuro checks q1h and close BP checks to keep MAP goal >85 for spinal perfusion. Stepped down from SICU maintaining MAP>65 off pressors, on midodrine. Stable and ready for discharge.      Plan:    - maintain MAPS >65  - Continue midodrine on discharge  - ASA and Plavix  - OOB and walking  - Discharge home today

## 2020-11-08 NOTE — DISCHARGE NOTE PROVIDER - NSDCCPCAREPLAN_GEN_ALL_CORE_FT
PRINCIPAL DISCHARGE DIAGNOSIS  Diagnosis: Thoracic aortic aneurysm  Assessment and Plan of Treatment:       SECONDARY DISCHARGE DIAGNOSES  Diagnosis: Other hypotension  Assessment and Plan of Treatment:     Diagnosis: Hypercholesteremia  Assessment and Plan of Treatment:     Diagnosis: Mallet finger  Assessment and Plan of Treatment:     Diagnosis: Back pain  Assessment and Plan of Treatment:     Diagnosis: History of varicose veins  Assessment and Plan of Treatment:     Diagnosis: GERD (gastroesophageal reflux disease)  Assessment and Plan of Treatment:     Diagnosis: Aberrant subclavian artery  Assessment and Plan of Treatment:

## 2020-11-08 NOTE — DISCHARGE NOTE PROVIDER - HOSPITAL COURSE
81 yo F with HLD, GERD, arthritis, and thoracic aortic aneurysm (5.5 cm) with aneurysmal aberrant right subclavian artery, presented for elective TEVAR with embolization of aberrant R SCA with Dr. Zayas. On 11/4/20 she underwent TEVAR with right subclavian artery aneurysm coil embolization. She tolerated the procedure well. Post op she had persistant hypotension requiring pressors. She was kept in the SICU for 3 days post op until she was able to be weaned off the pressors. She maintained MAPs>65 and stable SBP on midodrine. She remained asymptomatic. Bilateral groins and radial access sites well all soft, without hematomas. She was evaluated by PT and cleared for home. On day of discharge she is feeling well, ambulating and her blood pressure is within normal range. She is stable and discharged home with outpatient follow up.

## 2020-11-08 NOTE — PROGRESS NOTE ADULT - REASON FOR ADMISSION
thoracic aortic aneurysm, aneurysmal aberrant right subclavian artery

## 2020-11-08 NOTE — DISCHARGE NOTE PROVIDER - NSDCMRMEDTOKEN_GEN_ALL_CORE_FT
aspirin 81 mg oral tablet: 1 tab(s) orally once a day  Crestor: orally once a day  midodrine 10 mg oral tablet: 1 tab(s) orally every 8 hours  Plavix 75 mg oral tablet: 1 tab(s) orally once a day  Vicodin:

## 2020-11-08 NOTE — DISCHARGE NOTE PROVIDER - NSDCFUADDINST_GEN_ALL_CORE_FT
FOLLOW UP: in 1 week. Your appointment will be made. Call the office at  with any questions.  WOUND CARE: You may shower; soap and water over puncture sites. Do not scrub. Pat dry when done.   ACTIVITY: Ambulate as tolerated, but no heavy lifting (>10lbs) or strenuous exercise.    Call the office if you experience increasing pain, redness, swelling or drainage from incision sites, abdominal, chest or back pain, leg numbness, weakness or tingling, chills or temperature >101.4F.   NEW MEDICATIONS: Plavix 75mg once a day, Midodrine 10mg every 8 hours for low blood pressure.  *Both prescriptions have been sent to your [facility]*  Please measure your blood pressure twice a day (in the morning and in the evening, if the top number (systolic) pressure is <90 or <160 please call the office [phone] to notify Dr. Zayas.  DISCHARGE DESTINATION: Home.

## 2020-11-08 NOTE — DISCHARGE NOTE PROVIDER - CARE PROVIDER_API CALL
Daniel Zayas  SURGERY  [address], [place] [facility]  Phone: [phone]  Fax: [phone]  Follow Up Time: 1 week

## 2020-11-08 NOTE — DISCHARGE NOTE NURSING/CASE MANAGEMENT/SOCIAL WORK - PATIENT PORTAL LINK FT
You can access the FollowMyHealth Patient Portal offered by [facility] by registering at the following website: [email or web address]. By joining [employer]’s FollowMyHealth portal, you will also be able to view your health information using other applications (apps) compatible with our system.

## 2020-11-09 PROBLEM — I83.90 ASYMPTOMATIC VARICOSE VEINS OF UNSPECIFIED LOWER EXTREMITY: Chronic | Status: ACTIVE | Noted: 2020-11-03

## 2020-11-09 PROBLEM — M54.9 DORSALGIA, UNSPECIFIED: Chronic | Status: ACTIVE | Noted: 2020-11-03

## 2020-11-09 PROBLEM — M20.011 MALLET FINGER OF RIGHT FINGER(S): Chronic | Status: ACTIVE | Noted: 2020-11-03

## 2020-11-09 PROBLEM — K21.9 GASTRO-ESOPHAGEAL REFLUX DISEASE WITHOUT ESOPHAGITIS: Chronic | Status: ACTIVE | Noted: 2020-11-03

## 2020-11-09 PROBLEM — Q25.49 OTHER CONGENITAL MALFORMATIONS OF AORTA: Chronic | Status: ACTIVE | Noted: 2020-11-03

## 2020-11-11 ENCOUNTER — APPOINTMENT (OUTPATIENT)
Dept: VASCULAR SURGERY | Facility: CLINIC | Age: 80
End: 2020-11-11
Payer: MEDICARE

## 2020-11-11 PROCEDURE — 93971 EXTREMITY STUDY: CPT

## 2020-11-11 PROCEDURE — 99024 POSTOP FOLLOW-UP VISIT: CPT

## 2020-11-17 DIAGNOSIS — I95.81 POSTPROCEDURAL HYPOTENSION: ICD-10-CM
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
## 2020-11-17 DIAGNOSIS — I71.2 THORACIC AORTIC ANEURYSM, WITHOUT RUPTURE: ICD-10-CM

## 2020-11-17 DIAGNOSIS — Z91.041 RADIOGRAPHIC DYE ALLERGY STATUS: ICD-10-CM

## 2020-11-17 DIAGNOSIS — R33.0 DRUG INDUCED RETENTION OF URINE: ICD-10-CM

## 2020-11-17 DIAGNOSIS — Z79.82 LONG TERM (CURRENT) USE OF ASPIRIN: ICD-10-CM

## 2020-11-17 DIAGNOSIS — M20.011 MALLET FINGER OF RIGHT FINGER(S): ICD-10-CM

## 2020-11-17 DIAGNOSIS — T40.605A ADVERSE EFFECT OF UNSPECIFIED NARCOTICS, INITIAL ENCOUNTER: ICD-10-CM

## 2020-11-17 DIAGNOSIS — Q25.49 OTHER CONGENITAL MALFORMATIONS OF AORTA: ICD-10-CM

## 2020-11-17 DIAGNOSIS — Z91.013 ALLERGY TO SEAFOOD: ICD-10-CM

## 2020-11-17 DIAGNOSIS — E78.5 HYPERLIPIDEMIA, UNSPECIFIED: ICD-10-CM

## 2020-11-17 DIAGNOSIS — K21.9 GASTRO-ESOPHAGEAL REFLUX DISEASE WITHOUT ESOPHAGITIS: ICD-10-CM

## 2020-11-17 DIAGNOSIS — Z88.6 ALLERGY STATUS TO ANALGESIC AGENT: ICD-10-CM

## 2020-11-17 DIAGNOSIS — Q27.8 OTHER SPECIFIED CONGENITAL MALFORMATIONS OF PERIPHERAL VASCULAR SYSTEM: ICD-10-CM

## 2020-11-17 DIAGNOSIS — I83.90 ASYMPTOMATIC VARICOSE VEINS OF UNSPECIFIED LOWER EXTREMITY: ICD-10-CM

## 2020-11-17 DIAGNOSIS — M19.90 UNSPECIFIED OSTEOARTHRITIS, UNSPECIFIED SITE: ICD-10-CM

## 2020-11-17 DIAGNOSIS — T82.330A LEAKAGE OF AORTIC (BIFURCATION) GRAFT (REPLACEMENT), INITIAL ENCOUNTER: ICD-10-CM

## 2020-11-24 ENCOUNTER — APPOINTMENT (OUTPATIENT)
Dept: VASCULAR SURGERY | Facility: CLINIC | Age: 80
End: 2020-11-24
Payer: MEDICARE

## 2020-11-24 PROCEDURE — 99024 POSTOP FOLLOW-UP VISIT: CPT

## 2020-11-25 NOTE — REVIEW OF SYSTEMS
[Feeling Poorly] : feeling poorly [Feeling Tired] : feeling tired [Shortness Of Breath] : shortness of breath [As Noted in HPI] : as noted in HPI [Arthralgias] : arthralgias [Negative] : Heme/Lymph

## 2020-11-30 NOTE — REASON FOR VISIT
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  Pt has reported persistent malaise and radiating back pain to her chest since surgery.  Her BP has been well-controlled w/midodrine immediately post-op, but she has now d/c'd midodrine and her BP has been stable.  She has yet to restart her home BP meds.  Appetite poor, but she attempts go PO liquid intake whenever possible.

## 2020-11-30 NOTE — PHYSICAL EXAM
[Normal Thyroid] : the thyroid was normal [Normal Breath Sounds] : Normal breath sounds [Normal Heart Sounds] : normal heart sounds [Normal Rate and Rhythm] : normal rate and rhythm [2+] : left 2+ [No HSM] : no hepatosplenomegaly [No Rash or Lesion] : No rash or lesion [Purpura] : purpura [Alert] : alert [Calm] : calm [JVD] : no jugular venous distention  [Carotid Bruits] : no carotid bruits [Ankle Swelling (On Exam)] : not present [Abdomen Masses] : No abdominal masses [Abdomen Tenderness] : ~T ~M No abdominal tenderness [Petechiae] : no petechiae [Skin Ulcer] : no ulcer [Skin Induration] : no induration [de-identified] : Small habitus, NAD [de-identified] : NC/AT, anicteric [de-identified] : FROM throughout, strength 5/5x4, no palpable cords in extremities [de-identified] : Well-healed radial puncture and femoral access sites, still some ecchymosis noted

## 2020-11-30 NOTE — DISCUSSION/SUMMARY
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  Pt has reported persistent malaise and radiating back pain to her chest since surgery.  Her BP has been well-controlled w/midodrine immediately post-op, but she has now d/c'd midodrine and her BP has been stable.  She has yet to restart her home BP meds.  Appetite poor, but she attempts go PO liquid intake whenever possible.\par \par Recommended pt restart all home meds and encouraged PO intake today.  If pt feeling better in 2wks, will plan for CTA chest for post-op evaluation of her TEVAR.

## 2020-12-14 NOTE — PHYSICAL EXAM
[JVD] : no jugular venous distention  [Normal Breath Sounds] : Normal breath sounds [Normal Heart Sounds] : normal heart sounds [Abdominal Aorta] : Normal abdominal aorta [1+] : right 1+ [0] : right 0 [2+] : left 2+ [Ankle Swelling (On Exam)] : not present [Ankle Swelling On The Right] : of the right ankle [Varicose Veins Of Lower Extremities] : not present [Varicose Veins Of The Right Leg] : of the right leg [] : of the right leg [Abdomen Masses] : No abdominal masses [Abdomen Tenderness] : ~T ~M No abdominal tenderness [Abdominal Bruit] : no abdominal bruit  [Anxious] : anxious [de-identified] : well consistent with post op [de-identified] : anicteric [de-identified] : well healed incision [FreeTextEntry1] : groins soft\par incision healing well with no evidence of infection [de-identified] : 5/5 motor in all extremities, sensory neuro intact

## 2020-12-14 NOTE — REASON FOR VISIT
[de-identified] : TEVAR covering the subclavian with carotid subclavian bypass [de-identified] : 11/4/2020 [de-identified] : 8 [de-identified] : 1 [de-identified] : feeling debilitated\par poor apetite\par \par reports no focal neuro symptoms\par walking without assistance\par pain in back mid scapula since surgery"feels muscular" [Friend] : friend

## 2020-12-14 NOTE — DISCUSSION/SUMMARY
[FreeTextEntry1] : Overall as expected post op course\par \par list of reported BP measurements with -120\par therefore will slowly resume BP meds which were held for spinal cord prophylaxis once the mitodrine is tapered. taper to start in 1 week following new BP parameters\par \par encourage eating and exercise\par \par CTA in the next few weeks\par f/u in 1 week

## 2020-12-22 ENCOUNTER — APPOINTMENT (OUTPATIENT)
Dept: VASCULAR SURGERY | Facility: CLINIC | Age: 80
End: 2020-12-22
Payer: MEDICARE

## 2020-12-22 DIAGNOSIS — Q25.49 OTHER CONGENITAL MALFORMATIONS OF AORTA: ICD-10-CM

## 2020-12-22 DIAGNOSIS — I71.2 THORACIC AORTIC ANEURYSM, W/OUT RUPTURE: ICD-10-CM

## 2020-12-22 PROCEDURE — 99024 POSTOP FOLLOW-UP VISIT: CPT

## 2020-12-24 NOTE — PHYSICAL EXAM
[Normal Thyroid] : the thyroid was normal [Normal Breath Sounds] : Normal breath sounds [Normal Heart Sounds] : normal heart sounds [Normal Rate and Rhythm] : normal rate and rhythm [2+] : left 2+ [No HSM] : no hepatosplenomegaly [No Rash or Lesion] : No rash or lesion [Purpura] : purpura [Alert] : alert [Calm] : calm [JVD] : no jugular venous distention  [Carotid Bruits] : no carotid bruits [Ankle Swelling (On Exam)] : not present [Abdomen Masses] : No abdominal masses [Abdomen Tenderness] : ~T ~M No abdominal tenderness [Petechiae] : no petechiae [Skin Ulcer] : no ulcer [Skin Induration] : no induration [de-identified] : Small habitus, NAD [de-identified] : NC/AT, anicteric [de-identified] : FROM throughout, strength 5/5x4, no palpable cords in extremities [de-identified] : Well-healed radial puncture and femoral access sites, still some ecchymosis noted

## 2020-12-24 NOTE — DISCUSSION/SUMMARY
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  Back pain still persists, but improved.  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  If growth is noted, will plan for percutaneous closure w/a plug.  Pt will return to office in 6mos, will schedule CTA chest for same day at [facility].\par I personally discussed this patient with the Physician Assistant at the time of the visit. I agree with the assessment and plan as written

## 2020-12-24 NOTE — REASON FOR VISIT
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  Back pain still persists, but improved.  Pt underwent CTA chest today at [facility], returning today to discuss results.

## 2021-03-11 DIAGNOSIS — M54.9 DORSALGIA, UNSPECIFIED: ICD-10-CM

## 2022-03-10 NOTE — ED ADULT NURSE NOTE - SEVERITY
10 mm ground glass nodule in left lower lung seen and looked sl larger in 2011 ct abd.  No follow up needed.    Tree in bud may be Mycobacterial infection?  Check cultures for MAC.    Would check breathing test.  Could have cough from asthma/copd.    Give trial trelegy once daily-- continue if helps.    You are having swallow issues -- cannot swallow at times, prior fundoplication, and ongoing reflux.  Reflux may cause chronic cough.  Would recommend upper endoscopy to evaluate.    Night sweats unsual-- check blood and urine.        PAIN SCALE 7 OF 10.

## 2024-05-24 NOTE — PROGRESS NOTE ADULT - ASSESSMENT
81 yo F with HLD, GERD, arthritis, and thoracic aortic aneurysm (5.5 cm) with aneurysmal aberrant right subclavian artery, presents for elective surgery, taken to OR today for TEVAR with embolization of aberrant right SCA transfer to SICU post-op for close neuro checks q1h and close BP checks to keep MAP goal >85 for spinal perfusion per vascular for the first 24 hours then  >65        Neuro:  Percocet PRN  CV: goal MAP>65, on Midodrine 10mg TID, aspirin, Lipitor,Plavix  Chest pain overnight EKG and trops are WNL  Pulm: RA  GI/FEN: Regular diet enlive qd  : Voids + Urinary retention most likely 2* narcotic use and decreased ambulation - encourage ambulation and limitation on narcotics. On flomax Cont straight cath for PVR > 300 q6 hrs  Endo: ISS  ID: Ancef 3 doses post-op (11/4)  PPx: SCDs,  SQH  Lines: PIV, L rad jt Dc'd (11/4-11/7)   Wounds/Drains/Lines: x, R radial access line, B/L groin access,  PT: OOB to chair - PT ordered 11/5.      Dispo:  Step down to tele no
